# Patient Record
Sex: FEMALE | Race: BLACK OR AFRICAN AMERICAN | NOT HISPANIC OR LATINO | URBAN - METROPOLITAN AREA
[De-identification: names, ages, dates, MRNs, and addresses within clinical notes are randomized per-mention and may not be internally consistent; named-entity substitution may affect disease eponyms.]

---

## 2022-08-08 ENCOUNTER — INPATIENT (INPATIENT)
Facility: HOSPITAL | Age: 82
LOS: 9 days | Discharge: HOME | End: 2022-08-18
Attending: STUDENT IN AN ORGANIZED HEALTH CARE EDUCATION/TRAINING PROGRAM | Admitting: STUDENT IN AN ORGANIZED HEALTH CARE EDUCATION/TRAINING PROGRAM

## 2022-08-08 VITALS
TEMPERATURE: 98 F | HEART RATE: 62 BPM | RESPIRATION RATE: 18 BRPM | SYSTOLIC BLOOD PRESSURE: 153 MMHG | OXYGEN SATURATION: 93 % | DIASTOLIC BLOOD PRESSURE: 92 MMHG

## 2022-08-08 DIAGNOSIS — R07.9 CHEST PAIN, UNSPECIFIED: ICD-10-CM

## 2022-08-08 DIAGNOSIS — N18.6 END STAGE RENAL DISEASE: ICD-10-CM

## 2022-08-08 DIAGNOSIS — Z96.659 PRESENCE OF UNSPECIFIED ARTIFICIAL KNEE JOINT: Chronic | ICD-10-CM

## 2022-08-08 LAB
ALBUMIN SERPL ELPH-MCNC: 3.6 G/DL — SIGNIFICANT CHANGE UP (ref 3.3–5)
ALP SERPL-CCNC: 162 U/L — HIGH (ref 40–120)
ALT FLD-CCNC: 8 U/L — SIGNIFICANT CHANGE UP (ref 4–33)
ANION GAP SERPL CALC-SCNC: 18 MMOL/L — HIGH (ref 7–14)
APTT BLD: 35.9 SEC — SIGNIFICANT CHANGE UP (ref 27–36.3)
AST SERPL-CCNC: 14 U/L — SIGNIFICANT CHANGE UP (ref 4–32)
BASE EXCESS BLDV CALC-SCNC: -2.4 MMOL/L — LOW (ref -2–3)
BASOPHILS # BLD AUTO: 0.03 K/UL — SIGNIFICANT CHANGE UP (ref 0–0.2)
BASOPHILS NFR BLD AUTO: 0.3 % — SIGNIFICANT CHANGE UP (ref 0–2)
BILIRUB SERPL-MCNC: 1.1 MG/DL — SIGNIFICANT CHANGE UP (ref 0.2–1.2)
BLOOD GAS VENOUS COMPREHENSIVE RESULT: SIGNIFICANT CHANGE UP
BUN SERPL-MCNC: 58 MG/DL — HIGH (ref 7–23)
CALCIUM SERPL-MCNC: 10.3 MG/DL — SIGNIFICANT CHANGE UP (ref 8.4–10.5)
CHLORIDE BLDV-SCNC: 100 MMOL/L — SIGNIFICANT CHANGE UP (ref 96–108)
CHLORIDE SERPL-SCNC: 100 MMOL/L — SIGNIFICANT CHANGE UP (ref 98–107)
CO2 BLDV-SCNC: 25.7 MMOL/L — SIGNIFICANT CHANGE UP (ref 22–26)
CO2 SERPL-SCNC: 22 MMOL/L — SIGNIFICANT CHANGE UP (ref 22–31)
CREAT SERPL-MCNC: 13.13 MG/DL — HIGH (ref 0.5–1.3)
EGFR: 3 ML/MIN/1.73M2 — LOW
EOSINOPHIL # BLD AUTO: 0.02 K/UL — SIGNIFICANT CHANGE UP (ref 0–0.5)
EOSINOPHIL NFR BLD AUTO: 0.2 % — SIGNIFICANT CHANGE UP (ref 0–6)
GAS PNL BLDV: 135 MMOL/L — LOW (ref 136–145)
GLUCOSE BLDV-MCNC: 92 MG/DL — SIGNIFICANT CHANGE UP (ref 70–99)
GLUCOSE SERPL-MCNC: 98 MG/DL — SIGNIFICANT CHANGE UP (ref 70–99)
HCO3 BLDV-SCNC: 24 MMOL/L — SIGNIFICANT CHANGE UP (ref 22–29)
HCT VFR BLD CALC: 38.6 % — SIGNIFICANT CHANGE UP (ref 34.5–45)
HCT VFR BLDA CALC: 40 % — SIGNIFICANT CHANGE UP (ref 34.5–46.5)
HGB BLD CALC-MCNC: 13.3 G/DL — SIGNIFICANT CHANGE UP (ref 11.5–15.5)
HGB BLD-MCNC: 12.8 G/DL — SIGNIFICANT CHANGE UP (ref 11.5–15.5)
IANC: 9.21 K/UL — HIGH (ref 1.8–7.4)
IMM GRANULOCYTES NFR BLD AUTO: 0.6 % — SIGNIFICANT CHANGE UP (ref 0–1.5)
INR BLD: 1.18 RATIO — HIGH (ref 0.88–1.16)
LACTATE BLDV-MCNC: 1 MMOL/L — SIGNIFICANT CHANGE UP (ref 0.5–2)
LYMPHOCYTES # BLD AUTO: 0.63 K/UL — LOW (ref 1–3.3)
LYMPHOCYTES # BLD AUTO: 5.8 % — LOW (ref 13–44)
MAGNESIUM SERPL-MCNC: 2.4 MG/DL — SIGNIFICANT CHANGE UP (ref 1.6–2.6)
MCHC RBC-ENTMCNC: 31 PG — SIGNIFICANT CHANGE UP (ref 27–34)
MCHC RBC-ENTMCNC: 33.2 GM/DL — SIGNIFICANT CHANGE UP (ref 32–36)
MCV RBC AUTO: 93.5 FL — SIGNIFICANT CHANGE UP (ref 80–100)
MONOCYTES # BLD AUTO: 0.84 K/UL — SIGNIFICANT CHANGE UP (ref 0–0.9)
MONOCYTES NFR BLD AUTO: 7.8 % — SIGNIFICANT CHANGE UP (ref 2–14)
NEUTROPHILS # BLD AUTO: 9.21 K/UL — HIGH (ref 1.8–7.4)
NEUTROPHILS NFR BLD AUTO: 85.3 % — HIGH (ref 43–77)
NRBC # BLD: 0 /100 WBCS — SIGNIFICANT CHANGE UP
NRBC # FLD: 0 K/UL — SIGNIFICANT CHANGE UP
NT-PROBNP SERPL-SCNC: HIGH PG/ML
PCO2 BLDV: 48 MMHG — HIGH (ref 39–42)
PH BLDV: 7.31 — LOW (ref 7.32–7.43)
PHOSPHATE SERPL-MCNC: 4.7 MG/DL — HIGH (ref 2.5–4.5)
PLATELET # BLD AUTO: 228 K/UL — SIGNIFICANT CHANGE UP (ref 150–400)
PO2 BLDV: 64 MMHG — SIGNIFICANT CHANGE UP
POTASSIUM BLDV-SCNC: 4.4 MMOL/L — SIGNIFICANT CHANGE UP (ref 3.5–5.1)
POTASSIUM SERPL-MCNC: 4.5 MMOL/L — SIGNIFICANT CHANGE UP (ref 3.5–5.3)
POTASSIUM SERPL-SCNC: 4.5 MMOL/L — SIGNIFICANT CHANGE UP (ref 3.5–5.3)
PROT SERPL-MCNC: 7 G/DL — SIGNIFICANT CHANGE UP (ref 6–8.3)
PROTHROM AB SERPL-ACNC: 13.7 SEC — HIGH (ref 10.5–13.4)
RBC # BLD: 4.13 M/UL — SIGNIFICANT CHANGE UP (ref 3.8–5.2)
RBC # FLD: 16.4 % — HIGH (ref 10.3–14.5)
SAO2 % BLDV: 88.7 % — SIGNIFICANT CHANGE UP
SARS-COV-2 RNA SPEC QL NAA+PROBE: SIGNIFICANT CHANGE UP
SODIUM SERPL-SCNC: 140 MMOL/L — SIGNIFICANT CHANGE UP (ref 135–145)
TROPONIN T, HIGH SENSITIVITY RESULT: 78 NG/L — CRITICAL HIGH
TROPONIN T, HIGH SENSITIVITY RESULT: 79 NG/L — CRITICAL HIGH
WBC # BLD: 10.79 K/UL — HIGH (ref 3.8–10.5)
WBC # FLD AUTO: 10.79 K/UL — HIGH (ref 3.8–10.5)

## 2022-08-08 PROCEDURE — 99253 IP/OBS CNSLTJ NEW/EST LOW 45: CPT

## 2022-08-08 PROCEDURE — 99285 EMERGENCY DEPT VISIT HI MDM: CPT | Mod: 25

## 2022-08-08 PROCEDURE — 99223 1ST HOSP IP/OBS HIGH 75: CPT

## 2022-08-08 PROCEDURE — 36556 INSERT NON-TUNNEL CV CATH: CPT

## 2022-08-08 PROCEDURE — 71045 X-RAY EXAM CHEST 1 VIEW: CPT | Mod: 26

## 2022-08-08 RX ORDER — ACETAMINOPHEN 500 MG
1000 TABLET ORAL ONCE
Refills: 0 | Status: COMPLETED | OUTPATIENT
Start: 2022-08-08 | End: 2022-08-08

## 2022-08-08 RX ORDER — AMLODIPINE BESYLATE 2.5 MG/1
10 TABLET ORAL DAILY
Refills: 0 | Status: DISCONTINUED | OUTPATIENT
Start: 2022-08-09 | End: 2022-08-18

## 2022-08-08 RX ORDER — LIDOCAINE HCL 20 MG/ML
10 VIAL (ML) INJECTION ONCE
Refills: 0 | Status: COMPLETED | OUTPATIENT
Start: 2022-08-08 | End: 2022-08-08

## 2022-08-08 RX ORDER — SEVELAMER CARBONATE 2400 MG/1
800 POWDER, FOR SUSPENSION ORAL
Refills: 0 | Status: DISCONTINUED | OUTPATIENT
Start: 2022-08-08 | End: 2022-08-11

## 2022-08-08 RX ORDER — ACETAMINOPHEN 500 MG
650 TABLET ORAL EVERY 6 HOURS
Refills: 0 | Status: DISCONTINUED | OUTPATIENT
Start: 2022-08-08 | End: 2022-08-18

## 2022-08-08 RX ORDER — ONDANSETRON 8 MG/1
4 TABLET, FILM COATED ORAL EVERY 8 HOURS
Refills: 0 | Status: DISCONTINUED | OUTPATIENT
Start: 2022-08-08 | End: 2022-08-18

## 2022-08-08 RX ORDER — HEPARIN SODIUM 5000 [USP'U]/ML
5000 INJECTION INTRAVENOUS; SUBCUTANEOUS EVERY 8 HOURS
Refills: 0 | Status: COMPLETED | OUTPATIENT
Start: 2022-08-08 | End: 2022-08-16

## 2022-08-08 RX ORDER — SEVELAMER CARBONATE 2400 MG/1
1 POWDER, FOR SUSPENSION ORAL
Qty: 0 | Refills: 0 | DISCHARGE

## 2022-08-08 RX ORDER — METOPROLOL TARTRATE 50 MG
50 TABLET ORAL
Refills: 0 | Status: DISCONTINUED | OUTPATIENT
Start: 2022-08-08 | End: 2022-08-18

## 2022-08-08 RX ORDER — ATORVASTATIN CALCIUM 80 MG/1
20 TABLET, FILM COATED ORAL AT BEDTIME
Refills: 0 | Status: DISCONTINUED | OUTPATIENT
Start: 2022-08-08 | End: 2022-08-18

## 2022-08-08 RX ORDER — LANOLIN ALCOHOL/MO/W.PET/CERES
3 CREAM (GRAM) TOPICAL AT BEDTIME
Refills: 0 | Status: DISCONTINUED | OUTPATIENT
Start: 2022-08-08 | End: 2022-08-18

## 2022-08-08 RX ADMIN — Medication 10 MILLILITER(S): at 17:06

## 2022-08-08 RX ADMIN — Medication 400 MILLIGRAM(S): at 21:43

## 2022-08-08 NOTE — ED PROVIDER NOTE - ATTENDING CONTRIBUTION TO CARE
I performed a face-to-face evaluation of the patient and performed a history and physical examination. I agree with the history and physical examination. If this was a PA visit, I personally saw the patient with the PA and performed a substantive portion of the visit including all aspects of the medical decision making.    Dialysis fistula has clot and had TPA administered into it today. After that, felt muscle shaking and chest pain. Not likely PNA: no infectious Sx (eg, purulent cough). Not likely PE or other VTE: PERC or Wells low score, EKG no e/o R-heart strain. Will evaluate for for ACS. Admit for dialysis.

## 2022-08-08 NOTE — CONSULT NOTE ADULT - PROBLEM SELECTOR RECOMMENDATION 9
Pt. with ESRD on HD TIW (TTS schedule) via RUE AVF at [facility] in [place]. Pt's nephrologist is Dr. Parth Ellis. Last outpatient HD treatment was on 8/4/22. Trace Bl LE edema on exam. Pt. presenting with chest pain with elevated troponin and T-wave inversions on EKG. Recommend to rule out ACS.  Labs reviewed. Serum potassium and SCO2 are within normal limits. Pt does not appear uremic or volume overloaded on exam. Plan for HD tomorrow if ACS ruled out. HD consent obtained and placed in pt's chart. Monitor BP and labs.    Plan discussed with attending on call.    If you have any questions, please feel free to contact me  Trice Colvin  Nephrology Fellow  [phone] / Microsoft Teams(Preferred)  (After 5pm or on weekends please page the on-call fellow)

## 2022-08-08 NOTE — H&P ADULT - PROBLEM SELECTOR PLAN 3
Per patient's documents, she has hx of Afib though she does not know much of it.  -Will need to reach out to her PCP regarding hx of Afib and why she is not on AC. She denies hx of major GIB however she seems like a fall risk  -[id number] score 4  -Discussed with patient R/B of AC. Will hold AC tonight until further clarification with PCP/cards in AM of why she is not on AC. Daily risk of holding AC is very low.

## 2022-08-08 NOTE — ED PROVIDER NOTE - CADM POA PRESS ULCER
Pt complains of intermittent epigastric aching while watching television tonight. States she felt urge belch. Denies nausea SOB. States she took 1 SL nitro with some relief.
No

## 2022-08-08 NOTE — ED PROVIDER NOTE - OBJECTIVE STATEMENT
81 Yr old female HTN Left arm lymphedema ESRD On Tu/Th/Sat Right AVF last HD session on Thursday, post HD noted bleeding, Could not do HD on Saturday as HD catheter was blocked, patient followed with her Vascular surgeon today, IR adminstered 8 mg Alteplase in Right AVF following which patient had episode of right sided chest tightness and developed generalized muscle jerking.   Patient denies fever, cough, SOB, palpitations, abdominal pain, muscle/joint aches. 81 Yr old female HTN Left arm lymphedema ESRD (Nephrologist = Parth Ellis: [phone]) On Tu/Th/Sat Right AVF last HD session on Thursday, post HD noted bleeding, Could not do HD on Saturday as HD catheter was blocked, patient followed with her Vascular surgeon today, IR adminstered 8 mg Alteplase in Right AVF following which patient had episode of right sided chest tightness and developed generalized muscle jerking.   Patient denies fever, cough, SOB, palpitations, abdominal pain, muscle/joint aches.

## 2022-08-08 NOTE — ED PROVIDER NOTE - NS ED ROS FT
CONSTITUTIONAL: generalized muscle jerking   EYES/ENT: No visual changes;  No vertigo or throat pain   NECK: No pain or stiffness  RESPIRATORY: No cough, wheezing, hemoptysis; No shortness of breath  CARDIOVASCULAR: right chest tightness  GASTROINTESTINAL: No abdominal or epigastric pain. No nausea, vomiting, or hematemesis; No diarrhea or constipation. No melena or hematochezia.  GENITOURINARY: No dysuria, frequency or hematuria  NEUROLOGICAL: No numbness or weakness    All other review of systems is negative unless indicated above.
5

## 2022-08-08 NOTE — H&P ADULT - NSHPLABSRESULTS_GEN_ALL_CORE
08-08    140  |  100  |  58<H>  ----------------------------<  98  4.5   |  22  |  13.13<H>    Ca    10.3      08 Aug 2022 17:30  Phos  4.7     08-08  Mg     2.40     08-08    TPro  7.0  /  Alb  3.6  /  TBili  1.1  /  DBili  x   /  AST  14  /  ALT  8   /  AlkPhos  162<H>  08-08                            12.8   10.79 )-----------( 228      ( 08 Aug 2022 17:30 )             38.6     EKG: Afib . Two different EKGs were done however both seems to be Afib but rate controlled    CXR IMPRESSION: Cardiomegaly with right effusion.    LIVER FUNCTIONS - ( 08 Aug 2022 17:30 )  Alb: 3.6 g/dL / Pro: 7.0 g/dL / ALK PHOS: 162 U/L / ALT: 8 U/L / AST: 14 U/L / GGT: x             PT/INR - ( 08 Aug 2022 17:30 )   PT: 13.7 sec;   INR: 1.18 ratio         PTT - ( 08 Aug 2022 17:30 )  PTT:35.9 sec

## 2022-08-08 NOTE — ED ADULT NURSE REASSESSMENT NOTE - NS ED NURSE REASSESS COMMENT FT1
Received report from day shift RN. Patient received A&Ox4 with R femoral access placed by ED MD. Pt offering no complaints and is in no acute distress at this time.  Respirations even and labored, noted to be 88% on RA upon arrival and placed on 4LNC by author of note with resolve.  Noted to be febrile at this time, covering team paged at this time for further orders. Swelling to L arm noted consistent with chief complaint, KATERIN fistula noted. Stretcher in lowest position, wheels locked, side rails up as per protocol. Vital signs are per flowsheet.

## 2022-08-08 NOTE — H&P ADULT - ASSESSMENT
81 Yr old female HTN, Left arm lymphedema, reported hx of Afib not on AC, ESRD On Tu/Th/Sat, Right AVF last HD session on Thursday now p/w R chest pain reproducible on palpation after TPA of her fistula. Admitted for new vascular access for dialysis and chest pain work up.

## 2022-08-08 NOTE — ED PROVIDER NOTE - CLINICAL SUMMARY MEDICAL DECISION MAKING FREE TEXT BOX
81 Yr old female HTN Left arm lymphedema ESRD On Tu/Th/Sat Right AVF last HD session on Thursday, post HD noted bleeding, Could not do HD on Saturday as HD catheter was blocked, patient followed with her Vascular surgeon today, IR adminstered 8 mg Alteplase in Right AVF following which patient had episode of right sided chest tightness and developed generalized muscle jerking. VS wnl. PE BL creps, Right arm AV fistula has no redness/discharge/is non-tender, Left arm has lymphedema, BL LE are edematous. Plan for labs, CXR, EKG, Nephrology/Vascular consult.

## 2022-08-08 NOTE — CONSULT NOTE ADULT - SUBJECTIVE AND OBJECTIVE BOX
[facility] DIVISION OF KIDNEY DISEASES AND HYPERTENSION -- [phone]  -- INITIAL CONSULT NOTE  --------------------------------------------------------------------------------  HPI: 80 yo F with h/o ESRD on HD TIW (TTS via RUE AVF), HTN, L arm lymphedema sent from clinic for chest pain and upper body jerking movements. Nephrology consulted for ESRD, resumption of HD. Pt's nephrologist is Dr. Parth Ellis at [facility] in [place]. Last HD session was on Thursday, 8/4/22. Pt was noted to have light bleeding for AVF following dialysis session which resolved. Pt then went for dialysis session on Saturday, 8/6/22 but AVF was not functioning and pt was unable to receive HD. Today, pt went to see IR to get AVF evaluated. Was given alteplase and started having R sided chest tightness and upper body jerking movements. Pt was then sent to the ER.    Pt has been on HD for the past ~10 years due to uncontrolled HTN. Pt no longer makes urine. Denies any prior AVF malfunction. Pt has had rare episodes of intra-dialytic hypotension but not on any meds. Pt reports R sided chest tenderness and decreased appetite. Denies any headaches, fevers/chills, SOB, abdominal pain, and leg swelling.        PAST HISTORY  --------------------------------------------------------------------------------  PAST MEDICAL & SURGICAL HISTORY:  HTN (hypertension)  ESRD on hemodialysis  Lymphedema        FAMILY HISTORY: Multiple family members with HTN    PAST SOCIAL HISTORY: Denies ETOH, and history of smoking.    ALLERGIES & MEDICATIONS  --------------------------------------------------------------------------------  Allergies    No Known Allergies    Intolerances      Standing Inpatient Medications    PRN Inpatient Medications      REVIEW OF SYSTEMS  --------------------------------------------------------------------------------  Gen: No fevers/chills  Skin: No rashes  Head/Eyes/Ears: Normal hearing,   Respiratory: No dyspnea, cough  CV: Positive for chest pain  GI: No abdominal pain, Positive for decreased appetite  : No dysuria, hematuria  MSK: No edema  Heme: No easy bruising or bleeding  Psych: No significant depression    All other systems were reviewed and are negative, except as noted.    VITALS/PHYSICAL EXAM  --------------------------------------------------------------------------------  T(C): 36.7 (08-08-22 @ 13:55), Max: 36.7 (08-08-22 @ 13:55)  HR: 58 (08-08-22 @ 17:00) (58 - 62)  BP: 145/71 (08-08-22 @ 17:00) (145/71 - 153/92)  RR: 16 (08-08-22 @ 17:00) (16 - 18)  SpO2: 100% (08-08-22 @ 17:00) (93% - 100%)  Wt(kg): --        Physical Exam:  Gen: NAD  HEENT: MMM  Pulm: CTA B/L  CV: S1S2  Abd: Soft, +BS   Ext: Trace BL LE edema  Neuro: Awake  Skin: Warm and dry  Vascular access: RUE AVF, unable to palpate thrill. Poor bruit. R femoral non-tunneled HD catheter noted    LABS/STUDIES  --------------------------------------------------------------------------------              12.8   10.79 >-----------<  228      [08-08-22 @ 17:30]              38.6     140  |  100  |  58  ----------------------------<  98      [08-08-22 @ 17:30]  4.5   |  22  |  13.13        Ca     10.3     [08-08-22 @ 17:30]      Mg     2.40     [08-08-22 @ 17:30]      Phos  4.7     [08-08-22 @ 17:30]    TPro  7.0  /  Alb  3.6  /  TBili  1.1  /  DBili  x   /  AST  14  /  ALT  8   /  AlkPhos  162  [08-08-22 @ 17:30]    PT/INR: PT 13.7 , INR 1.18       [08-08-22 @ 17:30]  PTT: 35.9       [08-08-22 @ 17:30]    Creatinine Trend:  SCr 13.13 [08-08 @ 17:30] [facility] DIVISION OF KIDNEY DISEASES AND HYPERTENSION -- [phone]  -- INITIAL CONSULT NOTE  --------------------------------------------------------------------------------  HPI: 80 yo F with h/o ESRD on HD TIW (TTS via RUE AVF), HTN, L arm lymphedema sent from clinic for chest pain and upper body jerking movements. Nephrology consulted for ESRD, resumption of HD. Pt's nephrologist is Dr. Parth Ellis at [facility] in [place]. Last HD session was on Thursday, 8/4/22. Pt was noted to have light bleeding for AVF following dialysis session which resolved. Pt then went for dialysis session on Saturday, 8/6/22 but AVF was not functioning and pt was unable to receive HD. Today, pt went to see IR to get AVF evaluated. Was given alteplase and started having R sided chest tightness and upper body jerking movements. Pt was then sent to the ER.    Pt has been on HD for the past ~10 years due to uncontrolled HTN. Pt no longer makes urine. Denies any prior AVF malfunction. Pt has had rare episodes of intra-dialytic hypotension but not on any meds. Pt reports R sided chest tenderness and decreased appetite. Denies any headaches, fevers/chills, SOB, abdominal pain, and leg swelling.    PAST HISTORY  --------------------------------------------------------------------------------  PAST MEDICAL & SURGICAL HISTORY:  HTN (hypertension)  ESRD on hemodialysis  Lymphedema        FAMILY HISTORY: Multiple family members with HTN    PAST SOCIAL HISTORY: Denies ETOH, and history of smoking.    ALLERGIES & MEDICATIONS  --------------------------------------------------------------------------------  Allergies    No Known Allergies    Intolerances      Standing Inpatient Medications    PRN Inpatient Medications      REVIEW OF SYSTEMS  --------------------------------------------------------------------------------  Gen: No fevers/chills  Skin: No rashes  Head/Eyes/Ears: Normal hearing,   Respiratory: No dyspnea, cough  CV: Positive for chest pain  GI: No abdominal pain, Positive for decreased appetite  : No dysuria, hematuria  MSK: No edema  Heme: No easy bruising or bleeding  Psych: No significant depression    All other systems were reviewed and are negative, except as noted.    VITALS/PHYSICAL EXAM  --------------------------------------------------------------------------------  T(C): 36.7 (08-08-22 @ 13:55), Max: 36.7 (08-08-22 @ 13:55)  HR: 58 (08-08-22 @ 17:00) (58 - 62)  BP: 145/71 (08-08-22 @ 17:00) (145/71 - 153/92)  RR: 16 (08-08-22 @ 17:00) (16 - 18)  SpO2: 100% (08-08-22 @ 17:00) (93% - 100%)  Wt(kg): --        Physical Exam:  Gen: NAD  HEENT: MMM  Pulm: CTA B/L  CV: S1S2  Abd: Soft, +BS   Ext: Trace BL LE edema  Neuro: Awake  Skin: Warm and dry  Vascular access: RUE AVF, unable to palpate thrill. Poor bruit. R femoral non-tunneled HD catheter noted    LABS/STUDIES  --------------------------------------------------------------------------------              12.8   10.79 >-----------<  228      [08-08-22 @ 17:30]              38.6     140  |  100  |  58  ----------------------------<  98      [08-08-22 @ 17:30]  4.5   |  22  |  13.13        Ca     10.3     [08-08-22 @ 17:30]      Mg     2.40     [08-08-22 @ 17:30]      Phos  4.7     [08-08-22 @ 17:30]    TPro  7.0  /  Alb  3.6  /  TBili  1.1  /  DBili  x   /  AST  14  /  ALT  8   /  AlkPhos  162  [08-08-22 @ 17:30]    PT/INR: PT 13.7 , INR 1.18       [08-08-22 @ 17:30]  PTT: 35.9       [08-08-22 @ 17:30]    Creatinine Trend:  SCr 13.13 [08-08 @ 17:30]

## 2022-08-08 NOTE — ED PROCEDURE NOTE - CPROC ED INFORMED CONSENT1
Benefits, risks, and possible complications of procedure explained to patient/caregiver who verbalized understanding and gave written consent.
PAST MEDICAL HISTORY:  No pertinent past medical history

## 2022-08-08 NOTE — ED ADULT NURSE NOTE - CHIEF COMPLAINT QUOTE
Patient brought to ER by EMS from [facility] vascular Surgery for clotted dialysis access. TPA put into clot after that she had jerky movements and  c/o chest pain.  . Dialysis access right forearm. Has not been dialyzed since Thursday (Tues, Thurs, Sat).  Patient c/o active chest pain.

## 2022-08-08 NOTE — ED ADULT TRIAGE NOTE - CHIEF COMPLAINT QUOTE
Patient brought to ER by EMS from [facility] vascular Surgery for clotted dialysis access. TPA put into clot after that she had jerky movements and  c/o chest pain.  . Dialysis access right forearm. Has not been dialyzed since Thursday (Tues, Thurs, Sat). Patient brought to ER by EMS from [facility] vascular Surgery for clotted dialysis access. TPA put into clot after that she had jerky movements and  c/o chest pain.  . Dialysis access right forearm. Has not been dialyzed since Thursday (Tues, Thurs, Sat).  Patient c/o active chest pain.

## 2022-08-08 NOTE — ED ADULT NURSE NOTE - OBJECTIVE STATEMENT
Break Coverage;  Receive report from RN . Pt is alert and oriented x 4  sent from [place] with clotted dialysis access.   and chest pain.  Pt stated she started having jerky movements after  medicine  was placed in access to remove clots. Resp unlabored.  Pt NSR\ on cardiac monitor .   Dialysis port to R upper arm.  L arm lymphedema noted.  No IV access noted.  BP done on L leg.  MD aware.  Pt awaiting IV and labs.  Family at bedside

## 2022-08-08 NOTE — H&P ADULT - NSICDXPASTMEDICALHX_GEN_ALL_CORE_FT
PAST MEDICAL HISTORY:  Atrial fibrillation     ESRD on hemodialysis     HTN (hypertension)     Lymphedema

## 2022-08-08 NOTE — ED PROVIDER NOTE - PROGRESS NOTE DETAILS
Grace: Cr 13, K 4.5, trop ~78 x 2. Will repeat EKG. If no change from prior here today, then unlikely to be ACS.

## 2022-08-08 NOTE — H&P ADULT - NSHPOUTPATIENTPROVIDERS_GEN_ALL_CORE
Nephrologist = Parth Ellis: [phone] Nephrologist = Parth Ellis: [phone]  pCP: Chico Fonseca

## 2022-08-08 NOTE — H&P ADULT - HISTORY OF PRESENT ILLNESS
81 Yr old female HTN, Left arm lymphedema, ESRD On Tu/Th/Sat, Right AVF last HD session on Thursday. She had post HD noted bleeding on 8/4. Pt could not do HD on Saturday as HD catheter was blocked. Patient followed with her Vascular surgeon today and had adminstered 8 mg Alteplase in Right AVF after which patient had episode of right sided chest tightness and developed generalized R chest wall tenderness. She is otherwise in her usual state of health and does not move around much at baseline. Denies prior AVF malfunctions. Patient denies fever, cough, SOB, palpitations, abdominal pain, muscle/joint aches, dysuria, diarrhea, headaches.   In ED, VSS, Trop 78->79, Cr 13.13, probnp [id number]. EKG: new onset Afib as patient denies hx of atrial fibrillation. . Two different EKGs were done however both seems to be Afib but rate controlled. Nephro and Vascular seen in ED. 81 Yr old female HTN, Left arm lymphedema, reported hx of Afib not on AC, ESRD On Tu/Th/Sat, Right AVF last HD session on Thursday. She had post HD noted bleeding on 8/4. Pt could not do HD on Saturday as HD catheter was blocked. Patient followed with her Vascular surgeon today and had adminstered 8 mg Alteplase in Right AVF after which patient had episode of right sided chest tightness and developed generalized R chest wall tenderness. She is otherwise in her usual state of health and does not move around much at baseline. Denies prior AVF malfunctions. Patient denies fever, cough, SOB, palpitations, abdominal pain, muscle/joint aches, dysuria, diarrhea, headaches.   In ED, VSS, Trop 78->79, Cr 13.13, probnp [id number]. EKG: Afib . Two different EKGs were done however both seems to be Afib but rate controlled. Nephro and Vascular seen in ED.

## 2022-08-08 NOTE — ED PROVIDER NOTE - PHYSICAL EXAMINATION
PHYSICAL EXAM:    GENERAL: NAD  HEAD: Normocephalic  EYES: EOMI, PERRLA, conjunctiva and sclera clear  ENT: No erythema/pallor/petechiae/lesions  NECK: Supple  LUNG: BL crep, R>L  HEART: RRR, +S1/S2; No m/r/g  ABDOMEN: soft, NT/ND; BS audible   EXTREMITIES: Right arm AV fistula, no redness/discharge/is non-tender, Left arm has lymphedema, BL LE are edematous.   NERVOUS SYSTEM:  AAOx3, speech is clear. No sensory/motor deficits

## 2022-08-08 NOTE — H&P ADULT - PROBLEM SELECTOR PLAN 1
Chest pain is more MSK in nature. Reproducible to touch. Trop elevated likely in setting of CKD however EKg without obvious ischemic changes except for TWI unsure if they are old. Suspect this chest pain could be from an underlying hematoma after TPA.  -Will get CT chest noncont to eval for subq hematoma and pleural effusion  -Defer to cards consult for chest pain eval from cardiac perspective  -Trend Trop  -Echo pending in AM  -Hold ASA and AC for now unless clinical status changes

## 2022-08-08 NOTE — CONSULT NOTE ADULT - SUBJECTIVE AND OBJECTIVE BOX
VASCULAR SURGERY CONSULT NOTE  HPI: 81y F PMH HTN, Left arm lymphedema, ESRD on HD via RUE AVF (Tu/Th/Sa), presenting with malfunctioning AVF and chest pain. Patient reports she was unable to receive HD at her session this past Saturday, nurses told her fistula was "blocked". Last HD Thursday, at which time there was bleeding noted at the end of the session, resolved with pressure. Patient followed up with her Vascular Surgeon as an outpatient today (family cannot remember the name). IR administered 8 mg Alteplase into Right AVF following which patient had episode of right sided chest tightness and developed generalized muscle jerking, prompting presentation to ED. Patient denies previous revisions or additional procedures to fistula since its creation. Patient denies fever, cough, SOB, palpitations, abdominal pain, muscle/joint aches. Home Nephrologist is Parth Ellis        PAST MEDICAL & SURGICAL HISTORY:  HTN (hypertension)      ESRD on hemodialysis      Lymphedema      Home Medications:      Allergies    No Known Allergies    Intolerances        SOCIAL HISTORY: Denies tobacco, EtOH, other drug use      Physical Exam:  General: NAD, resting comfortably  HEENT: NC/AT, EOMI, normal hearing, no oral lesions, no LAD, neck supple  Pulmonary: normal resp effort on RA  Cardiovascular: NSR, no murmurs  Abdominal: soft, ND/NT, no organomegaly  Extremities: WWP, RUE BC AVF with palpable thrill  Neuro: A/O x 3  Pulses: palpable radial and DP pulses bilaterally      Vital Signs Last 24 Hrs  T(C): 36.7 (08 Aug 2022 13:55), Max: 36.7 (08 Aug 2022 13:55)  T(F): 98 (08 Aug 2022 13:55), Max: 98 (08 Aug 2022 13:55)  HR: 58 (08 Aug 2022 17:00) (58 - 62)  BP: 145/71 (08 Aug 2022 17:00) (145/71 - 153/92)  BP(mean): --  RR: 16 (08 Aug 2022 17:00) (16 - 18)  SpO2: 100% (08 Aug 2022 17:00) (93% - 100%)    Parameters below as of 08 Aug 2022 17:00  Patient On (Oxygen Delivery Method): room air        I&O's Summary          LABS:                        12.8   10.79 )-----------( 228      ( 08 Aug 2022 17:30 )             38.6     08-08    140  |  100  |  58<H>  ----------------------------<  98  4.5   |  22  |  13.13<H>    Ca    10.3      08 Aug 2022 17:30  Phos  4.7     08-08  Mg     2.40     08-08    TPro  7.0  /  Alb  3.6  /  TBili  1.1  /  DBili  x   /  AST  14  /  ALT  8   /  AlkPhos  162<H>  08-08    PT/INR - ( 08 Aug 2022 17:30 )   PT: 13.7 sec;   INR: 1.18 ratio         PTT - ( 08 Aug 2022 17:30 )  PTT:35.9 sec    CAPILLARY BLOOD GLUCOSE        LIVER FUNCTIONS - ( 08 Aug 2022 17:30 )  Alb: 3.6 g/dL / Pro: 7.0 g/dL / ALK PHOS: 162 U/L / ALT: 8 U/L / AST: 14 U/L / GGT: x               RADIOLOGY & ADDITIONAL STUDIES:  < from: Xray Chest 1 View AP/PA (08.08.22 @ 17:42) >    INTERPRETATION:  Right lower lung hazy opacity, which may represent   pleural effusion and/or atelactasis.        ******PRELIMINARY REPORT******      < end of copied text >   VASCULAR SURGERY CONSULT NOTE  HPI: 81y F PMH HTN, Left arm lymphedema, ESRD on HD via RUE AVF (Tu/Th/Sa), presenting with malfunctioning AVF and chest pain. Patient reports she was unable to receive HD at her session this past Saturday, nurses told her fistula was "blocked". Last HD Thursday, at which time there was bleeding noted at the end of the session, resolved with pressure. Patient [facility] today as an outpatient to have the fistula evaluated. 8 mg Alteplase into Right AVF following which patient had episode of right sided chest tightness and developed upper extremity jerking movements, prompting presentation to ED. Patient reports she has had tPA injected into fistula previously without any issue. Patient denies fever, cough, SOB, palpitations, abdominal pain, muscle/joint aches. Home Nephrologist is Parth Ellis.        PAST MEDICAL & SURGICAL HISTORY:  HTN (hypertension)      ESRD on hemodialysis      Lymphedema      Home Medications:      Allergies    No Known Allergies    Intolerances        SOCIAL HISTORY: Denies tobacco, EtOH, other drug use      Physical Exam:  General: NAD, resting comfortably  HEENT: NC/AT, EOMI, normal hearing, no oral lesions, no LAD, neck supple  Pulmonary: normal resp effort on RA  Cardiovascular: NSR, no murmurs  Abdominal: soft, ND/NT, no organomegaly  Extremities: LUE with lymphedema from fingers to shoulder. RUE BC AVF with palpable pulse. 2 capped angiocaths in proximal and distal ends of fistula, covered with tegaderms  Neuro: A/O x 2-3  Pulses: palpable radial and DP pulses bilaterally      Vital Signs Last 24 Hrs  T(C): 36.7 (08 Aug 2022 13:55), Max: 36.7 (08 Aug 2022 13:55)  T(F): 98 (08 Aug 2022 13:55), Max: 98 (08 Aug 2022 13:55)  HR: 58 (08 Aug 2022 17:00) (58 - 62)  BP: 145/71 (08 Aug 2022 17:00) (145/71 - 153/92)  BP(mean): --  RR: 16 (08 Aug 2022 17:00) (16 - 18)  SpO2: 100% (08 Aug 2022 17:00) (93% - 100%)    Parameters below as of 08 Aug 2022 17:00  Patient On (Oxygen Delivery Method): room air        I&O's Summary          LABS:                        12.8   10.79 )-----------( 228      ( 08 Aug 2022 17:30 )             38.6     08-08    140  |  100  |  58<H>  ----------------------------<  98  4.5   |  22  |  13.13<H>    Ca    10.3      08 Aug 2022 17:30  Phos  4.7     08-08  Mg     2.40     08-08    TPro  7.0  /  Alb  3.6  /  TBili  1.1  /  DBili  x   /  AST  14  /  ALT  8   /  AlkPhos  162<H>  08-08    PT/INR - ( 08 Aug 2022 17:30 )   PT: 13.7 sec;   INR: 1.18 ratio         PTT - ( 08 Aug 2022 17:30 )  PTT:35.9 sec    CAPILLARY BLOOD GLUCOSE        LIVER FUNCTIONS - ( 08 Aug 2022 17:30 )  Alb: 3.6 g/dL / Pro: 7.0 g/dL / ALK PHOS: 162 U/L / ALT: 8 U/L / AST: 14 U/L / GGT: x               RADIOLOGY & ADDITIONAL STUDIES:  < from: Xray Chest 1 View AP/PA (08.08.22 @ 17:42) >    INTERPRETATION:  Right lower lung hazy opacity, which may represent   pleural effusion and/or atelactasis.        ******PRELIMINARY REPORT******      < end of copied text >   VASCULAR SURGERY CONSULT NOTE  HPI: 81y F PMH HTN, Left arm lymphedema, ESRD on HD via RUE AVF (Tu/Th/Sa), presenting with malfunctioning AVF and chest pain. Patient reports she was unable to receive HD at her session this past Saturday, nurses told her fistula was "blocked". Last HD Thursday, at which time there was bleeding noted at the end of the session, resolved with pressure. Patient [facility] today as an outpatient to have the fistula evaluated. 8 mg Alteplase into Right AVF following which patient had episode of right sided chest tightness and developed upper extremity jerking movements, prompting presentation to ED. Patient reports she has had tPA injected into fistula previously without any issue. Patient denies fever, cough, SOB, palpitations, abdominal pain, muscle/joint aches. Home Nephrologist is Parth Ellis.        PAST MEDICAL & SURGICAL HISTORY:  HTN (hypertension)      ESRD on hemodialysis      Lymphedema      Home Medications:      Allergies    No Known Allergies    Intolerances        SOCIAL HISTORY: Denies tobacco, EtOH, other drug use      Physical Exam:  General: NAD, resting comfortably  HEENT: NC/AT, EOMI, normal hearing, no oral lesions, no LAD, neck supple  Pulmonary: normal resp effort on RA  Cardiovascular: NSR, no murmurs  Abdominal: soft, ND/NT, no organomegaly  Extremities: LUE with lymphedema from fingers to shoulder. RUE BC AVF with palpable pulse. 2 capped angiocaths in proximal and distal ends of fistula, covered with tegaderms  Neuro: A/O x 2-3  Groin: R femoral Cordis noted  Pulses: palpable radial and DP pulses bilaterally      Vital Signs Last 24 Hrs  T(C): 36.7 (08 Aug 2022 13:55), Max: 36.7 (08 Aug 2022 13:55)  T(F): 98 (08 Aug 2022 13:55), Max: 98 (08 Aug 2022 13:55)  HR: 58 (08 Aug 2022 17:00) (58 - 62)  BP: 145/71 (08 Aug 2022 17:00) (145/71 - 153/92)  BP(mean): --  RR: 16 (08 Aug 2022 17:00) (16 - 18)  SpO2: 100% (08 Aug 2022 17:00) (93% - 100%)    Parameters below as of 08 Aug 2022 17:00  Patient On (Oxygen Delivery Method): room air        I&O's Summary          LABS:                        12.8   10.79 )-----------( 228      ( 08 Aug 2022 17:30 )             38.6     08-08    140  |  100  |  58<H>  ----------------------------<  98  4.5   |  22  |  13.13<H>    Ca    10.3      08 Aug 2022 17:30  Phos  4.7     08-08  Mg     2.40     08-08    TPro  7.0  /  Alb  3.6  /  TBili  1.1  /  DBili  x   /  AST  14  /  ALT  8   /  AlkPhos  162<H>  08-08    PT/INR - ( 08 Aug 2022 17:30 )   PT: 13.7 sec;   INR: 1.18 ratio         PTT - ( 08 Aug 2022 17:30 )  PTT:35.9 sec    CAPILLARY BLOOD GLUCOSE        LIVER FUNCTIONS - ( 08 Aug 2022 17:30 )  Alb: 3.6 g/dL / Pro: 7.0 g/dL / ALK PHOS: 162 U/L / ALT: 8 U/L / AST: 14 U/L / GGT: x               RADIOLOGY & ADDITIONAL STUDIES:  < from: Xray Chest 1 View AP/PA (08.08.22 @ 17:42) >    INTERPRETATION:  Right lower lung hazy opacity, which may represent   pleural effusion and/or atelactasis.        ******PRELIMINARY REPORT******      < end of copied text >

## 2022-08-09 DIAGNOSIS — R07.9 CHEST PAIN, UNSPECIFIED: ICD-10-CM

## 2022-08-09 DIAGNOSIS — T82.590A OTHER MECHANICAL COMPLICATION OF SURGICALLY CREATED ARTERIOVENOUS FISTULA, INITIAL ENCOUNTER: ICD-10-CM

## 2022-08-09 DIAGNOSIS — Z29.9 ENCOUNTER FOR PROPHYLACTIC MEASURES, UNSPECIFIED: ICD-10-CM

## 2022-08-09 DIAGNOSIS — I10 ESSENTIAL (PRIMARY) HYPERTENSION: ICD-10-CM

## 2022-08-09 DIAGNOSIS — I48.91 UNSPECIFIED ATRIAL FIBRILLATION: ICD-10-CM

## 2022-08-09 LAB
ALBUMIN SERPL ELPH-MCNC: 3.5 G/DL — SIGNIFICANT CHANGE UP (ref 3.3–5)
ALP SERPL-CCNC: 160 U/L — HIGH (ref 40–120)
ALT FLD-CCNC: 6 U/L — SIGNIFICANT CHANGE UP (ref 4–33)
ANION GAP SERPL CALC-SCNC: 19 MMOL/L — HIGH (ref 7–14)
AST SERPL-CCNC: 11 U/L — SIGNIFICANT CHANGE UP (ref 4–32)
BASOPHILS # BLD AUTO: 0.04 K/UL — SIGNIFICANT CHANGE UP (ref 0–0.2)
BASOPHILS NFR BLD AUTO: 0.4 % — SIGNIFICANT CHANGE UP (ref 0–2)
BILIRUB SERPL-MCNC: 0.8 MG/DL — SIGNIFICANT CHANGE UP (ref 0.2–1.2)
BUN SERPL-MCNC: 67 MG/DL — HIGH (ref 7–23)
CALCIUM SERPL-MCNC: 10.1 MG/DL — SIGNIFICANT CHANGE UP (ref 8.4–10.5)
CHLORIDE SERPL-SCNC: 98 MMOL/L — SIGNIFICANT CHANGE UP (ref 98–107)
CO2 SERPL-SCNC: 22 MMOL/L — SIGNIFICANT CHANGE UP (ref 22–31)
CREAT SERPL-MCNC: 13.77 MG/DL — HIGH (ref 0.5–1.3)
DIALYSIS INSTRUMENT RESULT - HEPATITIS B SURFACE ANTIGEN: NEGATIVE — SIGNIFICANT CHANGE UP
EGFR: 2 ML/MIN/1.73M2 — LOW
EOSINOPHIL # BLD AUTO: 0.07 K/UL — SIGNIFICANT CHANGE UP (ref 0–0.5)
EOSINOPHIL NFR BLD AUTO: 0.6 % — SIGNIFICANT CHANGE UP (ref 0–6)
GLUCOSE SERPL-MCNC: 94 MG/DL — SIGNIFICANT CHANGE UP (ref 70–99)
HBV CORE AB SER-ACNC: SIGNIFICANT CHANGE UP
HBV SURFACE AB SER-ACNC: 15.6 MIU/ML — SIGNIFICANT CHANGE UP
HBV SURFACE AG SER-ACNC: SIGNIFICANT CHANGE UP
HCT VFR BLD CALC: 38.4 % — SIGNIFICANT CHANGE UP (ref 34.5–45)
HCV AB S/CO SERPL IA: 0.22 S/CO — SIGNIFICANT CHANGE UP (ref 0–0.99)
HCV AB SERPL-IMP: SIGNIFICANT CHANGE UP
HGB BLD-MCNC: 12.8 G/DL — SIGNIFICANT CHANGE UP (ref 11.5–15.5)
IANC: 9.03 K/UL — HIGH (ref 1.8–7.4)
IMM GRANULOCYTES NFR BLD AUTO: 0.7 % — SIGNIFICANT CHANGE UP (ref 0–1.5)
INR BLD: 1.3 RATIO — HIGH (ref 0.88–1.16)
LYMPHOCYTES # BLD AUTO: 0.96 K/UL — LOW (ref 1–3.3)
LYMPHOCYTES # BLD AUTO: 8.6 % — LOW (ref 13–44)
MCHC RBC-ENTMCNC: 31.1 PG — SIGNIFICANT CHANGE UP (ref 27–34)
MCHC RBC-ENTMCNC: 33.3 GM/DL — SIGNIFICANT CHANGE UP (ref 32–36)
MCV RBC AUTO: 93.2 FL — SIGNIFICANT CHANGE UP (ref 80–100)
MONOCYTES # BLD AUTO: 1.03 K/UL — HIGH (ref 0–0.9)
MONOCYTES NFR BLD AUTO: 9.2 % — SIGNIFICANT CHANGE UP (ref 2–14)
MRSA PCR RESULT.: SIGNIFICANT CHANGE UP
NEUTROPHILS # BLD AUTO: 9.03 K/UL — HIGH (ref 1.8–7.4)
NEUTROPHILS NFR BLD AUTO: 80.5 % — HIGH (ref 43–77)
NRBC # BLD: 0 /100 WBCS — SIGNIFICANT CHANGE UP
NRBC # FLD: 0 K/UL — SIGNIFICANT CHANGE UP
PLATELET # BLD AUTO: 219 K/UL — SIGNIFICANT CHANGE UP (ref 150–400)
POTASSIUM SERPL-MCNC: 4.4 MMOL/L — SIGNIFICANT CHANGE UP (ref 3.5–5.3)
POTASSIUM SERPL-SCNC: 4.4 MMOL/L — SIGNIFICANT CHANGE UP (ref 3.5–5.3)
PROT SERPL-MCNC: 6.3 G/DL — SIGNIFICANT CHANGE UP (ref 6–8.3)
PROTHROM AB SERPL-ACNC: 15.1 SEC — HIGH (ref 10.5–13.4)
RBC # BLD: 4.12 M/UL — SIGNIFICANT CHANGE UP (ref 3.8–5.2)
RBC # FLD: 16 % — HIGH (ref 10.3–14.5)
S AUREUS DNA NOSE QL NAA+PROBE: SIGNIFICANT CHANGE UP
SODIUM SERPL-SCNC: 139 MMOL/L — SIGNIFICANT CHANGE UP (ref 135–145)
TROPONIN T, HIGH SENSITIVITY RESULT: 93 NG/L — CRITICAL HIGH
WBC # BLD: 11.21 K/UL — HIGH (ref 3.8–10.5)
WBC # FLD AUTO: 11.21 K/UL — HIGH (ref 3.8–10.5)

## 2022-08-09 PROCEDURE — 93990 DOPPLER FLOW TESTING: CPT | Mod: 26

## 2022-08-09 PROCEDURE — 99223 1ST HOSP IP/OBS HIGH 75: CPT

## 2022-08-09 PROCEDURE — 99233 SBSQ HOSP IP/OBS HIGH 50: CPT

## 2022-08-09 PROCEDURE — 36580 REPLACE CVAD CATH: CPT | Mod: RT

## 2022-08-09 RX ORDER — CHLORHEXIDINE GLUCONATE 213 G/1000ML
1 SOLUTION TOPICAL DAILY
Refills: 0 | Status: DISCONTINUED | OUTPATIENT
Start: 2022-08-09 | End: 2022-08-18

## 2022-08-09 RX ADMIN — ATORVASTATIN CALCIUM 20 MILLIGRAM(S): 80 TABLET, FILM COATED ORAL at 21:46

## 2022-08-09 RX ADMIN — HEPARIN SODIUM 5000 UNIT(S): 5000 INJECTION INTRAVENOUS; SUBCUTANEOUS at 06:29

## 2022-08-09 RX ADMIN — CHLORHEXIDINE GLUCONATE 1 APPLICATION(S): 213 SOLUTION TOPICAL at 13:00

## 2022-08-09 RX ADMIN — HEPARIN SODIUM 5000 UNIT(S): 5000 INJECTION INTRAVENOUS; SUBCUTANEOUS at 21:47

## 2022-08-09 RX ADMIN — Medication 50 MILLIGRAM(S): at 17:25

## 2022-08-09 RX ADMIN — AMLODIPINE BESYLATE 10 MILLIGRAM(S): 2.5 TABLET ORAL at 13:16

## 2022-08-09 RX ADMIN — Medication 50 MILLIGRAM(S): at 11:19

## 2022-08-09 RX ADMIN — SEVELAMER CARBONATE 800 MILLIGRAM(S): 2400 POWDER, FOR SUSPENSION ORAL at 11:21

## 2022-08-09 RX ADMIN — HEPARIN SODIUM 5000 UNIT(S): 5000 INJECTION INTRAVENOUS; SUBCUTANEOUS at 13:16

## 2022-08-09 NOTE — PROGRESS NOTE ADULT - SUBJECTIVE AND OBJECTIVE BOX
Vascular Surgery Progress Note    Subjective/24hour Events:   Patient seen and examined.      Vital Signs:  Vital Signs Last 24 Hrs  T(C): 36.8 (09 Aug 2022 10:20), Max: 39.3 (08 Aug 2022 21:25)  T(F): 98.3 (09 Aug 2022 10:20), Max: 102.7 (08 Aug 2022 21:25)  HR: 72 (09 Aug 2022 13:00) (58 - 80)  BP: 132/75 (09 Aug 2022 13:00) (104/63 - 154/70)  BP(mean): --  RR: 17 (09 Aug 2022 14:00) (16 - 18)  SpO2: 99% (09 Aug 2022 14:00) (96% - 100%)    Parameters below as of 09 Aug 2022 14:00  Patient On (Oxygen Delivery Method): room air        CAPILLARY BLOOD GLUCOSE          I&O's Detail    09 Aug 2022 07:01  -  09 Aug 2022 16:27  --------------------------------------------------------  IN:    Other (mL): 400 mL  Total IN: 400 mL    OUT:    Other (mL): 2400 mL  Total OUT: 2400 mL    Total NET: -2000 mL          Physical Exam:  Gen: NAD, resting comfortably in bed  CV: Regular rate  Resp: Nonlabored breathing on room air  Ext: RUE with c/d/i dressing over sutured areas of AVF at former angiocath sites  Vasc: Thrill or pulse not palpated in fistula      Labs:    08-09    139  |  98  |  67<H>  ----------------------------<  94  4.4   |  22  |  13.77<H>    Ca    10.1      09 Aug 2022 06:13  Phos  4.7     08-08  Mg     2.40     08-08    TPro  6.3  /  Alb  3.5  /  TBili  0.8  /  DBili  x   /  AST  11  /  ALT  6   /  AlkPhos  160<H>  08-09    LIVER FUNCTIONS - ( 09 Aug 2022 06:13 )  Alb: 3.5 g/dL / Pro: 6.3 g/dL / ALK PHOS: 160 U/L / ALT: 6 U/L / AST: 11 U/L / GGT: x                                 12.8   11.21 )-----------( 219      ( 09 Aug 2022 06:13 )             38.4     PT/INR - ( 09 Aug 2022 06:13 )   PT: 15.1 sec;   INR: 1.30 ratio         PTT - ( 08 Aug 2022 17:30 )  PTT:35.9 sec

## 2022-08-09 NOTE — PROGRESS NOTE ADULT - ASSESSMENT
81y F PMH HTN, Left arm lymphedema, ESRD on HD via RUE AVF (Tu/Th/Sa), presenting with malfunctioning AVF and chest pain s/p TPA injection into AVF.     Plan:  - F/u RUE AVF Duplex, ordered   - HD via R groin [place]  - Remainder of care per primary team      Vascular (C Team) Surgery  [id number]

## 2022-08-09 NOTE — ED ADULT NURSE REASSESSMENT NOTE - NS ED NURSE REASSESS COMMENT FT1
Report given to [facility] at this time. [facility]. NAD noted. Respirations even and unlabored.

## 2022-08-09 NOTE — PROGRESS NOTE ADULT - SUBJECTIVE AND OBJECTIVE BOX
[facility] Division of Hospital Medicine  Rebecca Her MD  Pager (M-F, 8A-5P): [phone]  Other Times:  [id number]    Patient is a 81y old  Female who presents with a chief complaint of Chest pain (08 Aug 2022 22:02)      SUBJECTIVE / OVERNIGHT EVENTS:      MEDICATIONS  (STANDING):  amLODIPine   Tablet 10 milliGRAM(s) Oral daily  atorvastatin 20 milliGRAM(s) Oral at bedtime  chlorhexidine 2% Cloths 1 Application(s) Topical daily  heparin   Injectable 5000 Unit(s) SubCutaneous every 8 hours  metoprolol tartrate 50 milliGRAM(s) Oral two times a day  sevelamer carbonate 800 milliGRAM(s) Oral three times a day with meals    MEDICATIONS  (PRN):  acetaminophen     Tablet .. 650 milliGRAM(s) Oral every 6 hours PRN Temp greater or equal to 38C (100.4F), Mild Pain (1 - 3)  aluminum hydroxide/magnesium hydroxide/simethicone Suspension 30 milliLiter(s) Oral every 4 hours PRN Dyspepsia  melatonin 3 milliGRAM(s) Oral at bedtime PRN Insomnia  ondansetron Injectable 4 milliGRAM(s) IV Push every 8 hours PRN Nausea and/or Vomiting      CAPILLARY BLOOD GLUCOSE        I&O's Summary      PHYSICAL EXAM:  Vital Signs Last 24 Hrs  T(C): 37 (09 Aug 2022 06:55), Max: 39.3 (08 Aug 2022 21:25)  T(F): 98.6 (09 Aug 2022 06:55), Max: 102.7 (08 Aug 2022 21:25)  HR: 64 (09 Aug 2022 06:55) (58 - 69)  BP: 153/77 (09 Aug 2022 06:55) (104/63 - 154/70)  BP(mean): --  RR: 18 (09 Aug 2022 06:55) (16 - 18)  SpO2: 96% (09 Aug 2022 06:12) (93% - 100%)    Parameters below as of 09 Aug 2022 06:55  Patient On (Oxygen Delivery Method): nasal cannula  O2 Flow (L/min): 4      CONSTITUTIONAL: NAD, well-developed, well-groomed  EYES: EOMI; conjunctiva and sclera clear  ENMT: Moist oral mucosa  RESPIRATORY: Normal respiratory effort; lungs are clear to auscultation bilaterally  CARDIOVASCULAR: Regular rate and rhythm, normal S1 and S2, no murmur; No lower extremity edema  ABDOMEN: Nontender to palpation, normoactive bowel sounds, no rebound/guarding  MUSCULOSKELETAL:   no clubbing or cyanosis of digits; no joint swelling or tenderness to palpation  PSYCH: A+O to person, place, and time; affect appropriate  NEUROLOGY: CN 2-12 are intact and symmetric; no gross sensory deficits   SKIN: No rashes; no palpable lesions    LABS:                        12.8   11.21 )-----------( 219      ( 09 Aug 2022 06:13 )             38.4     08-09    139  |  98  |  67<H>  ----------------------------<  94  4.4   |  22  |  13.77<H>    Ca    10.1      09 Aug 2022 06:13  Phos  4.7     08-08  Mg     2.40     08-08    TPro  6.3  /  Alb  3.5  /  TBili  0.8  /  DBili  x   /  AST  11  /  ALT  6   /  AlkPhos  160<H>  08-09    PT/INR - ( 09 Aug 2022 06:13 )   PT: 15.1 sec;   INR: 1.30 ratio         PTT - ( 08 Aug 2022 17:30 )  PTT:35.9 sec          RADIOLOGY & ADDITIONAL TESTS:  Results Reviewed:   Imaging Personally Reviewed:  Electrocardiogram Personally Reviewed:    COORDINATION OF CARE:  Care Discussed with Consultants/Other Providers [Y/N]: Y  Prior or Outpatient Records Reviewed [Y/N]: Y   [facility] of Hospital Medicine  Rebecca Her MD  Pager (M-F, 8A-5P): [phone]  Other Times:  [id number]    Patient is a 81y old  Female who presents with a chief complaint of Chest pain (08 Aug 2022 22:02)      SUBJECTIVE / OVERNIGHT EVENTS:  Pt feels well.  She underwent HD earlier today - denies chest pain or sob.      MEDICATIONS  (STANDING):  amLODIPine   Tablet 10 milliGRAM(s) Oral daily  atorvastatin 20 milliGRAM(s) Oral at bedtime  chlorhexidine 2% Cloths 1 Application(s) Topical daily  heparin   Injectable 5000 Unit(s) SubCutaneous every 8 hours  metoprolol tartrate 50 milliGRAM(s) Oral two times a day  sevelamer carbonate 800 milliGRAM(s) Oral three times a day with meals    MEDICATIONS  (PRN):  acetaminophen     Tablet .. 650 milliGRAM(s) Oral every 6 hours PRN Temp greater or equal to 38C (100.4F), Mild Pain (1 - 3)  aluminum hydroxide/magnesium hydroxide/simethicone Suspension 30 milliLiter(s) Oral every 4 hours PRN Dyspepsia  melatonin 3 milliGRAM(s) Oral at bedtime PRN Insomnia  ondansetron Injectable 4 milliGRAM(s) IV Push every 8 hours PRN Nausea and/or Vomiting      CAPILLARY BLOOD GLUCOSE        I&O's Summary      PHYSICAL EXAM:  Vital Signs Last 24 Hrs  T(C): 37 (09 Aug 2022 06:55), Max: 39.3 (08 Aug 2022 21:25)  T(F): 98.6 (09 Aug 2022 06:55), Max: 102.7 (08 Aug 2022 21:25)  HR: 64 (09 Aug 2022 06:55) (58 - 69)  BP: 153/77 (09 Aug 2022 06:55) (104/63 - 154/70)  BP(mean): --  RR: 18 (09 Aug 2022 06:55) (16 - 18)  SpO2: 96% (09 Aug 2022 06:12) (93% - 100%)    Parameters below as of 09 Aug 2022 06:55  Patient On (Oxygen Delivery Method): nasal cannula  O2 Flow (L/min): 4      CONSTITUTIONAL: NAD, well-developed, well-groomed  EYES: EOMI; conjunctiva and sclera clear  ENMT: Moist oral mucosa  RESPIRATORY: Normal respiratory effort; lungs are clear to auscultation bilaterally  CARDIOVASCULAR: Regular rate and rhythm, normal S1 and S2, no murmur; No lower extremity edema  ABDOMEN: Nontender to palpation, normoactive bowel sounds, no rebound/guarding  MUSCULOSKELETAL:   no clubbing or cyanosis of digits; no joint swelling or tenderness to palpation  PSYCH: A+O to person, place, and time; affect appropriate  NEUROLOGY: CN 2-12 are intact and symmetric; no gross sensory deficits   SKIN: R groin catheter; RUE AVF     LABS:                        12.8   11.21 )-----------( 219      ( 09 Aug 2022 06:13 )             38.4     08-09    139  |  98  |  67<H>  ----------------------------<  94  4.4   |  22  |  13.77<H>    Ca    10.1      09 Aug 2022 06:13  Phos  4.7     08-08  Mg     2.40     08-08    TPro  6.3  /  Alb  3.5  /  TBili  0.8  /  DBili  x   /  AST  11  /  ALT  6   /  AlkPhos  160<H>  08-09    PT/INR - ( 09 Aug 2022 06:13 )   PT: 15.1 sec;   INR: 1.30 ratio         PTT - ( 08 Aug 2022 17:30 )  PTT:35.9 sec          RADIOLOGY & ADDITIONAL TESTS:  Results Reviewed:   Imaging Personally Reviewed:  Electrocardiogram Personally Reviewed:    COORDINATION OF CARE:  Care Discussed with Consultants/Other Providers [Y/N]: Y  Prior or Outpatient Records Reviewed [Y/N]: Y

## 2022-08-09 NOTE — PATIENT PROFILE ADULT - FALL HARM RISK - HARM RISK INTERVENTIONS

## 2022-08-10 PROCEDURE — 71250 CT THORAX DX C-: CPT | Mod: 26

## 2022-08-10 PROCEDURE — 99233 SBSQ HOSP IP/OBS HIGH 50: CPT

## 2022-08-10 PROCEDURE — 93306 TTE W/DOPPLER COMPLETE: CPT | Mod: 26

## 2022-08-10 RX ADMIN — HEPARIN SODIUM 5000 UNIT(S): 5000 INJECTION INTRAVENOUS; SUBCUTANEOUS at 21:38

## 2022-08-10 RX ADMIN — Medication 50 MILLIGRAM(S): at 05:42

## 2022-08-10 RX ADMIN — SEVELAMER CARBONATE 800 MILLIGRAM(S): 2400 POWDER, FOR SUSPENSION ORAL at 17:31

## 2022-08-10 RX ADMIN — CHLORHEXIDINE GLUCONATE 1 APPLICATION(S): 213 SOLUTION TOPICAL at 12:23

## 2022-08-10 RX ADMIN — HEPARIN SODIUM 5000 UNIT(S): 5000 INJECTION INTRAVENOUS; SUBCUTANEOUS at 13:22

## 2022-08-10 RX ADMIN — SEVELAMER CARBONATE 800 MILLIGRAM(S): 2400 POWDER, FOR SUSPENSION ORAL at 09:03

## 2022-08-10 RX ADMIN — HEPARIN SODIUM 5000 UNIT(S): 5000 INJECTION INTRAVENOUS; SUBCUTANEOUS at 05:43

## 2022-08-10 RX ADMIN — Medication 50 MILLIGRAM(S): at 17:31

## 2022-08-10 RX ADMIN — AMLODIPINE BESYLATE 10 MILLIGRAM(S): 2.5 TABLET ORAL at 05:42

## 2022-08-10 RX ADMIN — ATORVASTATIN CALCIUM 20 MILLIGRAM(S): 80 TABLET, FILM COATED ORAL at 21:38

## 2022-08-10 RX ADMIN — SEVELAMER CARBONATE 800 MILLIGRAM(S): 2400 POWDER, FOR SUSPENSION ORAL at 13:27

## 2022-08-10 NOTE — PROGRESS NOTE ADULT - SUBJECTIVE AND OBJECTIVE BOX
[facility] Division of Hospital Medicine  Rebecca Her MD  Pager (M-F, 8A-5P): [phone]  Other Times:  [id number]    Patient is a 81y old  Female who presents with a chief complaint of Chest pain (09 Aug 2022 16:27)      SUBJECTIVE / OVERNIGHT EVENTS:      MEDICATIONS  (STANDING):  amLODIPine   Tablet 10 milliGRAM(s) Oral daily  atorvastatin 20 milliGRAM(s) Oral at bedtime  chlorhexidine 2% Cloths 1 Application(s) Topical daily  heparin   Injectable 5000 Unit(s) SubCutaneous every 8 hours  metoprolol tartrate 50 milliGRAM(s) Oral two times a day  sevelamer carbonate 800 milliGRAM(s) Oral three times a day with meals    MEDICATIONS  (PRN):  acetaminophen     Tablet .. 650 milliGRAM(s) Oral every 6 hours PRN Temp greater or equal to 38C (100.4F), Mild Pain (1 - 3)  aluminum hydroxide/magnesium hydroxide/simethicone Suspension 30 milliLiter(s) Oral every 4 hours PRN Dyspepsia  melatonin 3 milliGRAM(s) Oral at bedtime PRN Insomnia  ondansetron Injectable 4 milliGRAM(s) IV Push every 8 hours PRN Nausea and/or Vomiting      CAPILLARY BLOOD GLUCOSE        I&O's Summary    09 Aug 2022 07:01  -  10 Aug 2022 07:00  --------------------------------------------------------  IN: 400 mL / OUT: 2400 mL / NET: -2000 mL        PHYSICAL EXAM:  Vital Signs Last 24 Hrs  T(C): 36.6 (10 Aug 2022 09:00), Max: 37.2 (10 Aug 2022 02:38)  T(F): 97.9 (10 Aug 2022 09:00), Max: 99 (10 Aug 2022 02:38)  HR: 100 (10 Aug 2022 09:00) (72 - 100)  BP: 126/68 (10 Aug 2022 09:00) (120/70 - 152/65)  BP(mean): --  RR: 18 (10 Aug 2022 09:00) (17 - 18)  SpO2: 94% (10 Aug 2022 09:00) (94% - 100%)    Parameters below as of 10 Aug 2022 09:00  Patient On (Oxygen Delivery Method): room air        CONSTITUTIONAL: NAD, well-developed, well-groomed  EYES: EOMI; conjunctiva and sclera clear  ENMT: Moist oral mucosa  RESPIRATORY: Normal respiratory effort; lungs are clear to auscultation bilaterally  CARDIOVASCULAR: Regular rate and rhythm, normal S1 and S2, no murmur; No lower extremity edema  ABDOMEN: Nontender to palpation, normoactive bowel sounds, no rebound/guarding  MUSCULOSKELETAL:   no clubbing or cyanosis of digits; no joint swelling or tenderness to palpation  PSYCH: A+O to person, place, and time; affect appropriate  NEUROLOGY: CN 2-12 are intact and symmetric; no gross sensory deficits   SKIN: No rashes; no palpable lesions    LABS:                        12.8   11.21 )-----------( 219      ( 09 Aug 2022 06:13 )             38.4     08-09    139  |  98  |  67<H>  ----------------------------<  94  4.4   |  22  |  13.77<H>    Ca    10.1      09 Aug 2022 06:13  Phos  4.7     08-08  Mg     2.40     08-08    TPro  6.3  /  Alb  3.5  /  TBili  0.8  /  DBili  x   /  AST  11  /  ALT  6   /  AlkPhos  160<H>  08-09    PT/INR - ( 09 Aug 2022 06:13 )   PT: 15.1 sec;   INR: 1.30 ratio         PTT - ( 08 Aug 2022 17:30 )  PTT:35.9 sec          RADIOLOGY & ADDITIONAL TESTS:  Results Reviewed:   Imaging Personally Reviewed:  Electrocardiogram Personally Reviewed:    COORDINATION OF CARE:  Care Discussed with Consultants/Other Providers [Y/N]: Y  Prior or Outpatient Records Reviewed [Y/N]: Y   [facility]  Rebecca Her MD  Pager (M-F, 8A-5P): [phone]  Other Times:  [id number]    Patient is a 81y old  Female who presents with a chief complaint of Chest pain (09 Aug 2022 16:27)      SUBJECTIVE / OVERNIGHT EVENTS:  Pt denies pain or chest discomfort.  no pain on RUE     MEDICATIONS  (STANDING):  amLODIPine   Tablet 10 milliGRAM(s) Oral daily  atorvastatin 20 milliGRAM(s) Oral at bedtime  chlorhexidine 2% Cloths 1 Application(s) Topical daily  heparin   Injectable 5000 Unit(s) SubCutaneous every 8 hours  metoprolol tartrate 50 milliGRAM(s) Oral two times a day  sevelamer carbonate 800 milliGRAM(s) Oral three times a day with meals    MEDICATIONS  (PRN):  acetaminophen     Tablet .. 650 milliGRAM(s) Oral every 6 hours PRN Temp greater or equal to 38C (100.4F), Mild Pain (1 - 3)  aluminum hydroxide/magnesium hydroxide/simethicone Suspension 30 milliLiter(s) Oral every 4 hours PRN Dyspepsia  melatonin 3 milliGRAM(s) Oral at bedtime PRN Insomnia  ondansetron Injectable 4 milliGRAM(s) IV Push every 8 hours PRN Nausea and/or Vomiting      CAPILLARY BLOOD GLUCOSE        I&O's Summary    09 Aug 2022 07:01  -  10 Aug 2022 07:00  --------------------------------------------------------  IN: 400 mL / OUT: 2400 mL / NET: -2000 mL        PHYSICAL EXAM:  Vital Signs Last 24 Hrs  T(C): 36.6 (10 Aug 2022 09:00), Max: 37.2 (10 Aug 2022 02:38)  T(F): 97.9 (10 Aug 2022 09:00), Max: 99 (10 Aug 2022 02:38)  HR: 100 (10 Aug 2022 09:00) (72 - 100)  BP: 126/68 (10 Aug 2022 09:00) (120/70 - 152/65)  BP(mean): --  RR: 18 (10 Aug 2022 09:00) (17 - 18)  SpO2: 94% (10 Aug 2022 09:00) (94% - 100%)    Parameters below as of 10 Aug 2022 09:00  Patient On (Oxygen Delivery Method): room air        CONSTITUTIONAL: NAD, well-developed, well-groomed  EYES: EOMI; conjunctiva and sclera clear  ENMT: Moist oral mucosa  RESPIRATORY: Normal respiratory effort; lungs are clear to auscultation bilaterally  CARDIOVASCULAR: Regular rate and rhythm, normal S1 and S2, no murmur; No lower extremity edema  ABDOMEN: Nontender to palpation, normoactive bowel sounds, no rebound/guarding  MUSCULOSKELETAL:   no clubbing or cyanosis of digits; no joint swelling or tenderness to palpation  PSYCH: A+O to person, place, and time; affect appropriate  NEUROLOGY: CN 2-12 are intact and symmetric; no gross sensory deficits   SKIN:RUE with AVf; R margaritain marcia   LABS:                        12.8   11.21 )-----------( 219      ( 09 Aug 2022 06:13 )             38.4     08-09    139  |  98  |  67<H>  ----------------------------<  94  4.4   |  22  |  13.77<H>    Ca    10.1      09 Aug 2022 06:13  Phos  4.7     08-08  Mg     2.40     08-08    TPro  6.3  /  Alb  3.5  /  TBili  0.8  /  DBili  x   /  AST  11  /  ALT  6   /  AlkPhos  160<H>  08-09    PT/INR - ( 09 Aug 2022 06:13 )   PT: 15.1 sec;   INR: 1.30 ratio         PTT - ( 08 Aug 2022 17:30 )  PTT:35.9 sec          RADIOLOGY & ADDITIONAL TESTS:  Results Reviewed:   Imaging Personally Reviewed:  Electrocardiogram Personally Reviewed:    COORDINATION OF CARE:  Care Discussed with Consultants/Other Providers [Y/N]: Y  Prior or Outpatient Records Reviewed [Y/N]: Y

## 2022-08-10 NOTE — PROGRESS NOTE ADULT - ASSESSMENT
81y F PMH HTN, Left arm lymphedema, ESRD on HD via RUE AVF (Tu/Th/Sa), presenting with malfunctioning AVF and chest pain s/p TPA injection into AVF.     RUE AVF duplex appreciated.    Plan:  - Please obtain vein mapping  - HD via [facility]  - Remainder of care per primary team      Vascular (C Team) Surgery  [id number]

## 2022-08-10 NOTE — PROGRESS NOTE ADULT - PROBLEM SELECTOR PLAN 4
- appreciate vascular follow up  - Duplex reviewed - will need vein mapping  - continue to use R groin Shiley as HD access

## 2022-08-10 NOTE — PROGRESS NOTE ADULT - SUBJECTIVE AND OBJECTIVE BOX
[facility] Division of Hospital Medicine  Rebecca Her MD  Pager (M-F, 8A-5P): [phone]  Other Times:  [phone]    Patient is a 81y old  Female who presents with a chief complaint of Chest pain (10 Aug 2022 10:18)      SUBJECTIVE / OVERNIGHT EVENTS:      MEDICATIONS  (STANDING):  amLODIPine   Tablet 10 milliGRAM(s) Oral daily  atorvastatin 20 milliGRAM(s) Oral at bedtime  chlorhexidine 2% Cloths 1 Application(s) Topical daily  heparin   Injectable 5000 Unit(s) SubCutaneous every 8 hours  metoprolol tartrate 50 milliGRAM(s) Oral two times a day  sevelamer carbonate 800 milliGRAM(s) Oral three times a day with meals    MEDICATIONS  (PRN):  acetaminophen     Tablet .. 650 milliGRAM(s) Oral every 6 hours PRN Temp greater or equal to 38C (100.4F), Mild Pain (1 - 3)  aluminum hydroxide/magnesium hydroxide/simethicone Suspension 30 milliLiter(s) Oral every 4 hours PRN Dyspepsia  melatonin 3 milliGRAM(s) Oral at bedtime PRN Insomnia  ondansetron Injectable 4 milliGRAM(s) IV Push every 8 hours PRN Nausea and/or Vomiting      CAPILLARY BLOOD GLUCOSE        I&O's Summary    09 Aug 2022 07:01  -  10 Aug 2022 07:00  --------------------------------------------------------  IN: 400 mL / OUT: 2400 mL / NET: -2000 mL        PHYSICAL EXAM:  Vital Signs Last 24 Hrs  T(C): 36.6 (10 Aug 2022 09:00), Max: 37.2 (10 Aug 2022 02:38)  T(F): 97.9 (10 Aug 2022 09:00), Max: 99 (10 Aug 2022 02:38)  HR: 100 (10 Aug 2022 09:00) (72 - 100)  BP: 126/68 (10 Aug 2022 09:00) (120/70 - 152/65)  BP(mean): --  RR: 18 (10 Aug 2022 09:00) (17 - 18)  SpO2: 94% (10 Aug 2022 09:00) (94% - 100%)    Parameters below as of 10 Aug 2022 09:00  Patient On (Oxygen Delivery Method): room air        CONSTITUTIONAL: NAD, well-developed, well-groomed  EYES: EOMI; conjunctiva and sclera clear  ENMT: Moist oral mucosa  RESPIRATORY: Normal respiratory effort; lungs are clear to auscultation bilaterally  CARDIOVASCULAR: Regular rate and rhythm, normal S1 and S2, no murmur; No lower extremity edema  ABDOMEN: Nontender to palpation, normoactive bowel sounds, no rebound/guarding  MUSCULOSKELETAL:   no clubbing or cyanosis of digits; no joint swelling or tenderness to palpation  PSYCH: A+O to person, place, and time; affect appropriate  NEUROLOGY: CN 2-12 are intact and symmetric; no gross sensory deficits   SKIN: No rashes; no palpable lesions    LABS:                        12.8   11.21 )-----------( 219      ( 09 Aug 2022 06:13 )             38.4     08-09    139  |  98  |  67<H>  ----------------------------<  94  4.4   |  22  |  13.77<H>    Ca    10.1      09 Aug 2022 06:13  Phos  4.7     08-08  Mg     2.40     08-08    TPro  6.3  /  Alb  3.5  /  TBili  0.8  /  DBili  x   /  AST  11  /  ALT  6   /  AlkPhos  160<H>  08-09    PT/INR - ( 09 Aug 2022 06:13 )   PT: 15.1 sec;   INR: 1.30 ratio         PTT - ( 08 Aug 2022 17:30 )  PTT:35.9 sec          RADIOLOGY & ADDITIONAL TESTS:  Results Reviewed:   Imaging Personally Reviewed:  Electrocardiogram Personally Reviewed:    COORDINATION OF CARE:  Care Discussed with Consultants/Other Providers [Y/N]: Y  Prior or Outpatient Records Reviewed [Y/N]: Y

## 2022-08-10 NOTE — PROGRESS NOTE ADULT - SUBJECTIVE AND OBJECTIVE BOX
Vascular Surgery Progress Note    Subjective/24hour Events:   Patient seen and examined.      Vital Signs:  Vital Signs Last 24 Hrs  T(C): 36.7 (10 Aug 2022 13:00), Max: 37.2 (10 Aug 2022 02:38)  T(F): 98.1 (10 Aug 2022 13:00), Max: 99 (10 Aug 2022 02:38)  HR: 77 (10 Aug 2022 13:00) (73 - 100)  BP: 125/64 (10 Aug 2022 13:00) (125/64 - 152/65)  BP(mean): --  RR: 18 (10 Aug 2022 13:00) (17 - 18)  SpO2: 97% (10 Aug 2022 13:00) (94% - 100%)    Parameters below as of 10 Aug 2022 13:00  Patient On (Oxygen Delivery Method): room air        CAPILLARY BLOOD GLUCOSE          I&O's Detail    09 Aug 2022 07:01  -  10 Aug 2022 07:00  --------------------------------------------------------  IN:    Other (mL): 400 mL  Total IN: 400 mL    OUT:    Other (mL): 2400 mL  Total OUT: 2400 mL    Total NET: -2000 mL          Physical Exam:  Gen: NAD, resting comfortably in bed  CV: Regular rate  Resp: Nonlabored breathing on room air  Ext: RUE with c/d/i dressing over sutured areas of AVF at former angiocath sites  Vasc: Thrill or pulse not palpated in fistula        Labs:    08-09    139  |  98  |  67<H>  ----------------------------<  94  4.4   |  22  |  13.77<H>    Ca    10.1      09 Aug 2022 06:13  Phos  4.7     08-08  Mg     2.40     08-08    TPro  6.3  /  Alb  3.5  /  TBili  0.8  /  DBili  x   /  AST  11  /  ALT  6   /  AlkPhos  160<H>  08-09    LIVER FUNCTIONS - ( 09 Aug 2022 06:13 )  Alb: 3.5 g/dL / Pro: 6.3 g/dL / ALK PHOS: 160 U/L / ALT: 6 U/L / AST: 11 U/L / GGT: x                                 12.8   11.21 )-----------( 219      ( 09 Aug 2022 06:13 )             38.4     PT/INR - ( 09 Aug 2022 06:13 )   PT: 15.1 sec;   INR: 1.30 ratio         PTT - ( 08 Aug 2022 17:30 )  PTT:35.9 sec

## 2022-08-11 LAB
ALBUMIN SERPL ELPH-MCNC: 3.5 G/DL — SIGNIFICANT CHANGE UP (ref 3.3–5)
ALP SERPL-CCNC: 154 U/L — HIGH (ref 40–120)
ALT FLD-CCNC: 5 U/L — SIGNIFICANT CHANGE UP (ref 4–33)
ANION GAP SERPL CALC-SCNC: 14 MMOL/L — SIGNIFICANT CHANGE UP (ref 7–14)
AST SERPL-CCNC: 11 U/L — SIGNIFICANT CHANGE UP (ref 4–32)
BASOPHILS # BLD AUTO: 0.02 K/UL — SIGNIFICANT CHANGE UP (ref 0–0.2)
BASOPHILS NFR BLD AUTO: 0.3 % — SIGNIFICANT CHANGE UP (ref 0–2)
BILIRUB SERPL-MCNC: 0.5 MG/DL — SIGNIFICANT CHANGE UP (ref 0.2–1.2)
BLD GP AB SCN SERPL QL: NEGATIVE — SIGNIFICANT CHANGE UP
BUN SERPL-MCNC: 37 MG/DL — HIGH (ref 7–23)
CALCIUM SERPL-MCNC: 9.4 MG/DL — SIGNIFICANT CHANGE UP (ref 8.4–10.5)
CHLORIDE SERPL-SCNC: 98 MMOL/L — SIGNIFICANT CHANGE UP (ref 98–107)
CO2 SERPL-SCNC: 27 MMOL/L — SIGNIFICANT CHANGE UP (ref 22–31)
CREAT SERPL-MCNC: 8.2 MG/DL — HIGH (ref 0.5–1.3)
EGFR: 4 ML/MIN/1.73M2 — LOW
EOSINOPHIL # BLD AUTO: 0.13 K/UL — SIGNIFICANT CHANGE UP (ref 0–0.5)
EOSINOPHIL NFR BLD AUTO: 1.8 % — SIGNIFICANT CHANGE UP (ref 0–6)
GLUCOSE SERPL-MCNC: 94 MG/DL — SIGNIFICANT CHANGE UP (ref 70–99)
HCT VFR BLD CALC: 40 % — SIGNIFICANT CHANGE UP (ref 34.5–45)
HGB BLD-MCNC: 13.1 G/DL — SIGNIFICANT CHANGE UP (ref 11.5–15.5)
IANC: 5.39 K/UL — SIGNIFICANT CHANGE UP (ref 1.8–7.4)
IMM GRANULOCYTES NFR BLD AUTO: 0.7 % — SIGNIFICANT CHANGE UP (ref 0–1.5)
INR BLD: 1.04 RATIO — SIGNIFICANT CHANGE UP (ref 0.88–1.16)
LYMPHOCYTES # BLD AUTO: 1.05 K/UL — SIGNIFICANT CHANGE UP (ref 1–3.3)
LYMPHOCYTES # BLD AUTO: 14.2 % — SIGNIFICANT CHANGE UP (ref 13–44)
MAGNESIUM SERPL-MCNC: 2.2 MG/DL — SIGNIFICANT CHANGE UP (ref 1.6–2.6)
MCHC RBC-ENTMCNC: 30.4 PG — SIGNIFICANT CHANGE UP (ref 27–34)
MCHC RBC-ENTMCNC: 32.8 GM/DL — SIGNIFICANT CHANGE UP (ref 32–36)
MCV RBC AUTO: 92.8 FL — SIGNIFICANT CHANGE UP (ref 80–100)
MONOCYTES # BLD AUTO: 0.78 K/UL — SIGNIFICANT CHANGE UP (ref 0–0.9)
MONOCYTES NFR BLD AUTO: 10.5 % — SIGNIFICANT CHANGE UP (ref 2–14)
NEUTROPHILS # BLD AUTO: 5.39 K/UL — SIGNIFICANT CHANGE UP (ref 1.8–7.4)
NEUTROPHILS NFR BLD AUTO: 72.5 % — SIGNIFICANT CHANGE UP (ref 43–77)
NRBC # BLD: 0 /100 WBCS — SIGNIFICANT CHANGE UP
NRBC # FLD: 0 K/UL — SIGNIFICANT CHANGE UP
PHOSPHATE SERPL-MCNC: 2.3 MG/DL — LOW (ref 2.5–4.5)
PLATELET # BLD AUTO: 256 K/UL — SIGNIFICANT CHANGE UP (ref 150–400)
POTASSIUM SERPL-MCNC: 2.9 MMOL/L — CRITICAL LOW (ref 3.5–5.3)
POTASSIUM SERPL-SCNC: 2.9 MMOL/L — CRITICAL LOW (ref 3.5–5.3)
PROT SERPL-MCNC: 6.9 G/DL — SIGNIFICANT CHANGE UP (ref 6–8.3)
PROTHROM AB SERPL-ACNC: 12.1 SEC — SIGNIFICANT CHANGE UP (ref 10.5–13.4)
RBC # BLD: 4.31 M/UL — SIGNIFICANT CHANGE UP (ref 3.8–5.2)
RBC # FLD: 16.1 % — HIGH (ref 10.3–14.5)
RH IG SCN BLD-IMP: POSITIVE — SIGNIFICANT CHANGE UP
SODIUM SERPL-SCNC: 139 MMOL/L — SIGNIFICANT CHANGE UP (ref 135–145)
WBC # BLD: 7.42 K/UL — SIGNIFICANT CHANGE UP (ref 3.8–10.5)
WBC # FLD AUTO: 7.42 K/UL — SIGNIFICANT CHANGE UP (ref 3.8–10.5)

## 2022-08-11 PROCEDURE — 99233 SBSQ HOSP IP/OBS HIGH 50: CPT

## 2022-08-11 PROCEDURE — 99232 SBSQ HOSP IP/OBS MODERATE 35: CPT

## 2022-08-11 RX ADMIN — HEPARIN SODIUM 5000 UNIT(S): 5000 INJECTION INTRAVENOUS; SUBCUTANEOUS at 21:08

## 2022-08-11 RX ADMIN — Medication 50 MILLIGRAM(S): at 12:29

## 2022-08-11 RX ADMIN — HEPARIN SODIUM 5000 UNIT(S): 5000 INJECTION INTRAVENOUS; SUBCUTANEOUS at 05:09

## 2022-08-11 RX ADMIN — Medication 50 MILLIGRAM(S): at 21:08

## 2022-08-11 RX ADMIN — ATORVASTATIN CALCIUM 20 MILLIGRAM(S): 80 TABLET, FILM COATED ORAL at 21:08

## 2022-08-11 RX ADMIN — HEPARIN SODIUM 5000 UNIT(S): 5000 INJECTION INTRAVENOUS; SUBCUTANEOUS at 12:29

## 2022-08-11 RX ADMIN — CHLORHEXIDINE GLUCONATE 1 APPLICATION(S): 213 SOLUTION TOPICAL at 12:30

## 2022-08-11 NOTE — CHART NOTE - NSCHARTNOTEFT_GEN_A_CORE
Pt with hypokalemia and hypophosphatemia on labs this morning, drawn prior to HD. Per renal supplements placed in dialysis bath however pt unable to tolerate full session of HD due to low BP. Pt on bilateral arm precautions 2/2 lymphadema in one arm and AVF in the other there for labs drawn in HD. Per Renal, hold off on additional potassium or phos supplements at this time. In AM, will call HD RN to ask if they could draw BMP off AVF.

## 2022-08-11 NOTE — PROGRESS NOTE ADULT - ASSESSMENT
81y F PMH HTN, Left arm lymphedema, ESRD on HD via RUE AVF (Tu/Th/Sa), presenting with malfunctioning AVF and chest pain s/p TPA injection into AVF.     RUE AVF duplex appreciated.    Plan:  - Please obtain vein mapping  - Plan for new AVF creation next week  - Would appreciate medical and cardiac optimization  - HD via R groin Shiley  - Remainder of care per primary team      Vascular (C Team) Surgery  [id number]

## 2022-08-11 NOTE — PROGRESS NOTE ADULT - SUBJECTIVE AND OBJECTIVE BOX
[facility] Division of Hospital Medicine  Rebecca Her MD  Pager (M-F, 8A-5P): [phone]  Other Times:  [phone]    Patient is a 81y old  Female who presents with a chief complaint of Chest pain (10 Aug 2022 15:46)      SUBJECTIVE / OVERNIGHT EVENTS:      MEDICATIONS  (STANDING):  amLODIPine   Tablet 10 milliGRAM(s) Oral daily  atorvastatin 20 milliGRAM(s) Oral at bedtime  chlorhexidine 2% Cloths 1 Application(s) Topical daily  heparin   Injectable 5000 Unit(s) SubCutaneous every 8 hours  metoprolol tartrate 50 milliGRAM(s) Oral two times a day  sevelamer carbonate 800 milliGRAM(s) Oral three times a day with meals    MEDICATIONS  (PRN):  acetaminophen     Tablet .. 650 milliGRAM(s) Oral every 6 hours PRN Temp greater or equal to 38C (100.4F), Mild Pain (1 - 3)  aluminum hydroxide/magnesium hydroxide/simethicone Suspension 30 milliLiter(s) Oral every 4 hours PRN Dyspepsia  melatonin 3 milliGRAM(s) Oral at bedtime PRN Insomnia  ondansetron Injectable 4 milliGRAM(s) IV Push every 8 hours PRN Nausea and/or Vomiting      CAPILLARY BLOOD GLUCOSE        I&O's Summary    11 Aug 2022 07:01  -  11 Aug 2022 10:26  --------------------------------------------------------  IN: 600 mL / OUT: 700 mL / NET: -100 mL        PHYSICAL EXAM:  Vital Signs Last 24 Hrs  T(C): 36.4 (11 Aug 2022 08:19), Max: 36.8 (10 Aug 2022 17:00)  T(F): 97.5 (11 Aug 2022 08:19), Max: 98.3 (10 Aug 2022 20:10)  HR: 98 (11 Aug 2022 08:19) (66 - 98)  BP: 91/50 (11 Aug 2022 08:19) (91/50 - 131/66)  BP(mean): --  RR: 18 (11 Aug 2022 08:19) (18 - 19)  SpO2: 98% (11 Aug 2022 08:19) (93% - 99%)    Parameters below as of 11 Aug 2022 08:19  Patient On (Oxygen Delivery Method): room air        CONSTITUTIONAL: NAD, well-developed, well-groomed  EYES: EOMI; conjunctiva and sclera clear  ENMT: Moist oral mucosa  RESPIRATORY: Normal respiratory effort; lungs are clear to auscultation bilaterally  CARDIOVASCULAR: Regular rate and rhythm, normal S1 and S2, no murmur; No lower extremity edema  ABDOMEN: Nontender to palpation, normoactive bowel sounds, no rebound/guarding  MUSCULOSKELETAL:   no clubbing or cyanosis of digits; no joint swelling or tenderness to palpation  PSYCH: A+O to person, place, and time; affect appropriate  NEUROLOGY: CN 2-12 are intact and symmetric; no gross sensory deficits   SKIN: No rashes; no palpable lesions    LABS:                        13.1   7.42  )-----------( 256      ( 11 Aug 2022 06:35 )             40.0     08-11    139  |  98  |  37<H>  ----------------------------<  94  2.9<LL>   |  27  |  8.20<H>    Ca    9.4      11 Aug 2022 06:35  Phos  2.3     08-11  Mg     2.20     08-11    TPro  6.9  /  Alb  3.5  /  TBili  0.5  /  DBili  x   /  AST  11  /  ALT  5   /  AlkPhos  154<H>  08-11    PT/INR - ( 11 Aug 2022 06:35 )   PT: 12.1 sec;   INR: 1.04 ratio                   RADIOLOGY & ADDITIONAL TESTS:  Results Reviewed:   Imaging Personally Reviewed:  Electrocardiogram Personally Reviewed:    COORDINATION OF CARE:  Care Discussed with Consultants/Other Providers [Y/N]: Y  Prior or Outpatient Records Reviewed [Y/N]: Y   [facility] Division of Hospital Medicine  Rebecca Her MD  Pager (M-F, 8A-5P): [phone]  Other Times:  [phone]    Patient is a 81y old  Female who presents with a chief complaint of Chest pain (10 Aug 2022 15:46)      SUBJECTIVE / OVERNIGHT EVENTS:  pt without complaints    MEDICATIONS  (STANDING):  amLODIPine   Tablet 10 milliGRAM(s) Oral daily  atorvastatin 20 milliGRAM(s) Oral at bedtime  chlorhexidine 2% Cloths 1 Application(s) Topical daily  heparin   Injectable 5000 Unit(s) SubCutaneous every 8 hours  metoprolol tartrate 50 milliGRAM(s) Oral two times a day  sevelamer carbonate 800 milliGRAM(s) Oral three times a day with meals    MEDICATIONS  (PRN):  acetaminophen     Tablet .. 650 milliGRAM(s) Oral every 6 hours PRN Temp greater or equal to 38C (100.4F), Mild Pain (1 - 3)  aluminum hydroxide/magnesium hydroxide/simethicone Suspension 30 milliLiter(s) Oral every 4 hours PRN Dyspepsia  melatonin 3 milliGRAM(s) Oral at bedtime PRN Insomnia  ondansetron Injectable 4 milliGRAM(s) IV Push every 8 hours PRN Nausea and/or Vomiting      CAPILLARY BLOOD GLUCOSE        I&O's Summary    11 Aug 2022 07:01  -  11 Aug 2022 10:26  --------------------------------------------------------  IN: 600 mL / OUT: 700 mL / NET: -100 mL        PHYSICAL EXAM:  Vital Signs Last 24 Hrs  T(C): 36.4 (11 Aug 2022 08:19), Max: 36.8 (10 Aug 2022 17:00)  T(F): 97.5 (11 Aug 2022 08:19), Max: 98.3 (10 Aug 2022 20:10)  HR: 98 (11 Aug 2022 08:19) (66 - 98)  BP: 91/50 (11 Aug 2022 08:19) (91/50 - 131/66)  BP(mean): --  RR: 18 (11 Aug 2022 08:19) (18 - 19)  SpO2: 98% (11 Aug 2022 08:19) (93% - 99%)    Parameters below as of 11 Aug 2022 08:19  Patient On (Oxygen Delivery Method): room air        CONSTITUTIONAL: NAD, well-developed, well-groomed  EYES: EOMI; conjunctiva and sclera clear  ENMT: Moist oral mucosa  RESPIRATORY: Normal respiratory effort; lungs are clear to auscultation bilaterally  CARDIOVASCULAR: Regular rate and rhythm, normal S1 and S2, no murmur; No lower extremity edema  ABDOMEN: Nontender to palpation, normoactive bowel sounds, no rebound/guarding  MUSCULOSKELETAL:   no clubbing or cyanosis of digits; no joint swelling or tenderness to palpation  PSYCH: A+O to person, place, and time; affect appropriate  NEUROLOGY: CN 2-12 are intact and symmetric; no gross sensory deficits   SKIN: RUE AVF; R [facility]     LABS:                        13.1   7.42  )-----------( 256      ( 11 Aug 2022 06:35 )             40.0     08-11    139  |  98  |  37<H>  ----------------------------<  94  2.9<LL>   |  27  |  8.20<H>    Ca    9.4      11 Aug 2022 06:35  Phos  2.3     08-11  Mg     2.20     08-11    TPro  6.9  /  Alb  3.5  /  TBili  0.5  /  DBili  x   /  AST  11  /  ALT  5   /  AlkPhos  154<H>  08-11    PT/INR - ( 11 Aug 2022 06:35 )   PT: 12.1 sec;   INR: 1.04 ratio                   RADIOLOGY & ADDITIONAL TESTS:  Results Reviewed:   Imaging Personally Reviewed:  Electrocardiogram Personally Reviewed:    COORDINATION OF CARE:  Care Discussed with Consultants/Other Providers [Y/N]: Y  Prior or Outpatient Records Reviewed [Y/N]: Y

## 2022-08-11 NOTE — PROVIDER CONTACT NOTE (OTHER) - ASSESSMENT
Pt A&O X4, denies any chest pain, palpitations, sob, n/v, or dizziness. Pt asymptomatic, no acute distress noted

## 2022-08-11 NOTE — PROGRESS NOTE ADULT - SUBJECTIVE AND OBJECTIVE BOX
===========================================  [facility] NEPHROLOGY CONSULT TEAM  -----------------------------------------------------------------------------    Alex Jenkins Pls contact me via [employer] Teams today for any q's or c's about the patient!  After 5 pm or on weekends use [employer] for fellow on call    ============================================  [facility] DIVISION OF KIDNEY DISEASES AND HYPERTENSION -- [phone]  --------------------------------------------------------------------------------  8/11/22  - Chart reviewed. Pt seen and examined  - Feeling well  - HD x 1 hour only earlier as BP fell. Took AM BP meds.       PAST HISTORY  --------------------------------------------------------------------------------  PAST MEDICAL & SURGICAL HISTORY:  HTN (hypertension)  ESRD on hemodialysis  Lymphedema        FAMILY HISTORY: Multiple family members with HTN    PAST SOCIAL HISTORY: Denies ETOH, and history of smoking.    ALLERGIES & MEDICATIONS  --------------------------------------------------------------------------------  Allergies    No Known Allergies    Intolerances    MEDICATIONS  (STANDING):  amLODIPine   Tablet 10 milliGRAM(s) Oral daily  atorvastatin 20 milliGRAM(s) Oral at bedtime  chlorhexidine 2% Cloths 1 Application(s) Topical daily  heparin   Injectable 5000 Unit(s) SubCutaneous every 8 hours  metoprolol tartrate 50 milliGRAM(s) Oral two times a day  sevelamer carbonate 800 milliGRAM(s) Oral three times a day with meals    MEDICATIONS  (PRN):  acetaminophen     Tablet .. 650 milliGRAM(s) Oral every 6 hours PRN Temp greater or equal to 38C (100.4F), Mild Pain (1 - 3)  aluminum hydroxide/magnesium hydroxide/simethicone Suspension 30 milliLiter(s) Oral every 4 hours PRN Dyspepsia  melatonin 3 milliGRAM(s) Oral at bedtime PRN Insomnia  ondansetron Injectable 4 milliGRAM(s) IV Push every 8 hours PRN Nausea and/or Vomiting    REVIEW OF SYSTEMS  --------------------------------------------------------------------------------  Gen: No fevers/chills  Skin: No rashes  Head/Eyes/Ears: Normal hearing,   Respiratory: No dyspnea, cough  CV: Positive for chest pain  GI: No abdominal pain, Positive for decreased appetite  : No dysuria, hematuria  MSK: No edema  Heme: No easy bruising or bleeding  Psych: No significant depression    All other systems were reviewed and are negative, except as noted.    VITALS/PHYSICAL EXAM  --------------------------------------------------------------------------------  Vital Signs Last 24 Hrs  T(C): 36.4 (11 Aug 2022 08:19), Max: 36.8 (10 Aug 2022 17:00)  T(F): 97.5 (11 Aug 2022 08:19), Max: 98.3 (10 Aug 2022 20:10)  HR: 98 (11 Aug 2022 08:19) (66 - 98)  BP: 91/50 (11 Aug 2022 08:19) (91/50 - 131/66)  BP(mean): --  RR: 18 (11 Aug 2022 08:19) (18 - 19)  SpO2: 98% (11 Aug 2022 08:19) (93% - 99%)    Parameters below as of 11 Aug 2022 08:19  Patient On (Oxygen Delivery Method): room air    GEN: Awake, alert, NAD  HEAD: Normocephalic and atraumatic,   EYES: Anicteric sclerae, EOM's grossly intact  NECK: No JVD, no thyromegaly  PULM: Comfortable work of breathing, clear BS  CV: Pulses 2+ symmetric bilaterally, regular rate and rhythm  ABD: Not distended, soft, non-tender,   EXT: No edema, No deformities  PSYCH: Appropriate mood and affect, no suicidal ideations elicited  NEURO: No facial asymmetry, no tremors, no observed movement disorders  SKIN: No rashes or lesions on exposed skin, No jaundice  RUE PIOTR STOKES [facility]      LABS/STUDIES  --------------------------------------------------------------------------------             08-11    139  |  98  |  37<H>  ----------------------------<  94  2.9<LL>   |  27  |  8.20<H>    Ca    9.4      11 Aug 2022 06:35  Phos  2.3     08-11  Mg     2.20     08-11    TPro  6.9  /  Alb  3.5  /  TBili  0.5  /  DBili  x   /  AST  11  /  ALT  5   /  AlkPhos  154<H>  08-11

## 2022-08-11 NOTE — PROVIDER CONTACT NOTE (OTHER) - REASON
patient potassium 2.9
BMP ordered for patient, unable to draw d/t B/L arm precautions
patient afib up to 140's not sustaining, sustaining in the 110's

## 2022-08-11 NOTE — PROGRESS NOTE ADULT - SUBJECTIVE AND OBJECTIVE BOX
Vascular Surgery Progress Note    Subjective/24hour Events:   Patient seen and examined.      Vital Signs:  Vital Signs Last 24 Hrs  T(C): 36.2 (11 Aug 2022 13:15), Max: 37 (11 Aug 2022 12:29)  T(F): 97.2 (11 Aug 2022 13:15), Max: 98.6 (11 Aug 2022 12:29)  HR: 92 (11 Aug 2022 13:15) (66 - 110)  BP: 114/55 (11 Aug 2022 13:15) (91/50 - 131/66)  BP(mean): --  RR: 16 (11 Aug 2022 13:15) (16 - 19)  SpO2: 96% (11 Aug 2022 13:15) (93% - 100%)    Parameters below as of 11 Aug 2022 13:15  Patient On (Oxygen Delivery Method): room air        CAPILLARY BLOOD GLUCOSE          I&O's Detail    11 Aug 2022 07:01  -  11 Aug 2022 16:01  --------------------------------------------------------  IN:    Other (mL): 600 mL  Total IN: 600 mL    OUT:    Other (mL): 700 mL  Total OUT: 700 mL    Total NET: -100 mL          Physical Exam:  Gen: NAD, resting comfortably in bed  CV: Regular rate  Resp: Nonlabored breathing on room air  Ext: RUE with c/d/i dressing over sutured areas of AVF at former angiocath sites  Vasc: Thrill or pulse not palpated in fistula        Labs:    08-11    139  |  98  |  37<H>  ----------------------------<  94  2.9<LL>   |  27  |  8.20<H>    Ca    9.4      11 Aug 2022 06:35  Phos  2.3     08-11  Mg     2.20     08-11    TPro  6.9  /  Alb  3.5  /  TBili  0.5  /  DBili  x   /  AST  11  /  ALT  5   /  AlkPhos  154<H>  08-11    LIVER FUNCTIONS - ( 11 Aug 2022 06:35 )  Alb: 3.5 g/dL / Pro: 6.9 g/dL / ALK PHOS: 154 U/L / ALT: 5 U/L / AST: 11 U/L / GGT: x                                 13.1   7.42  )-----------( 256      ( 11 Aug 2022 06:35 )             40.0     PT/INR - ( 11 Aug 2022 06:35 )   PT: 12.1 sec;   INR: 1.04 ratio

## 2022-08-11 NOTE — PROVIDER CONTACT NOTE (OTHER) - RECOMMENDATIONS
provider made aware
provider made aware, dialysis RN Tarik contacted if able to draw blood - unable to draw blood aguila kennedy status. Educator [facility] RN contacted -RN unable to draw blood via LE -out of scope of practice
provider made aware

## 2022-08-11 NOTE — PROGRESS NOTE ADULT - ASSESSMENT
Pt with ESRD on HD admitted for AVF malfunction    =================================  # ESRD ON HD  - SCHED: TTS  - UNIT: [facility]  --- PLAN:  > HD: HD today. Orders placed and reviewed. D/w HD unit --> HD aborted after 1 hour due to symptomatic hypotension. Pt took her BP meds this AM  > NEXT HD: Will anticipate for Sat 8/13 if labs prem are stable    # HYPOTENSION: Pt reminded not to take AM BP meds during HD days  # ACCESS: Vasc on board. Pls ff up plan for AVF creation  # HYPOKALEMIA: 2.9 earlier. Tried to correct with HD but only had 1 hour of treatment because of hypotension. Recommend to repeat BMP and replete as needed  # ANEMIA: Hgb >10  # MBD: Phos is low. Hold Renvela.

## 2022-08-11 NOTE — PROVIDER CONTACT NOTE (OTHER) - ACTION/TREATMENT ORDERED:
aware
administer AM metoprolol dose now, and move 6pm metoprolol to 10pm
will f/u, no new orders at this time

## 2022-08-11 NOTE — PROGRESS NOTE ADULT - PROBLEM SELECTOR PLAN 4
- appreciate vascular- plan for new AVF next week   - Duplex reviewed - will need vein mapping  - continue to use R margaritain Shiley as HD access

## 2022-08-12 PROCEDURE — 99233 SBSQ HOSP IP/OBS HIGH 50: CPT

## 2022-08-12 PROCEDURE — 93985 DUP-SCAN HEMO COMPL BI STD: CPT | Mod: 26

## 2022-08-12 RX ADMIN — AMLODIPINE BESYLATE 10 MILLIGRAM(S): 2.5 TABLET ORAL at 05:38

## 2022-08-12 RX ADMIN — HEPARIN SODIUM 5000 UNIT(S): 5000 INJECTION INTRAVENOUS; SUBCUTANEOUS at 21:21

## 2022-08-12 RX ADMIN — HEPARIN SODIUM 5000 UNIT(S): 5000 INJECTION INTRAVENOUS; SUBCUTANEOUS at 14:42

## 2022-08-12 RX ADMIN — HEPARIN SODIUM 5000 UNIT(S): 5000 INJECTION INTRAVENOUS; SUBCUTANEOUS at 05:38

## 2022-08-12 RX ADMIN — Medication 50 MILLIGRAM(S): at 05:38

## 2022-08-12 RX ADMIN — ATORVASTATIN CALCIUM 20 MILLIGRAM(S): 80 TABLET, FILM COATED ORAL at 21:23

## 2022-08-12 RX ADMIN — Medication 50 MILLIGRAM(S): at 17:19

## 2022-08-12 RX ADMIN — CHLORHEXIDINE GLUCONATE 1 APPLICATION(S): 213 SOLUTION TOPICAL at 14:45

## 2022-08-12 NOTE — CHART NOTE - NSCHARTNOTEFT_GEN_A_CORE
Discussed with HD RN that we need bw from AVF per renal d/t hypokalemia, per HD RN floors RN can draw from pt's femoral shiley. D/w nursing manage on floors, per NM nurses on floors cannot draw from femoral shiley. This was relayed to nephro, ok to get labs tomorrow during dialysis as we have no choice. Pt asymptomatic, and eating well today.    Ashish ACP [id number]

## 2022-08-12 NOTE — PROGRESS NOTE ADULT - ASSESSMENT
81y F PMH HTN, Left arm lymphedema, ESRD on HD via RUE AVF (Tu/Th/Sa), presenting with malfunctioning AVF and chest pain s/p TPA injection into AVF.     RUE AVF duplex appreciated.    Vein mapping appreciated.    Plan:  - Plan for new AVF creation next week (likely Thursday)  - Protect SAM  - Would appreciate medical and cardiac optimization  - HD via R margaritain Verón[place]  - Remainder of care per primary team      Vascular (C Team) Surgery  [id number]

## 2022-08-12 NOTE — PROGRESS NOTE ADULT - SUBJECTIVE AND OBJECTIVE BOX
[facility] Division of Hospital Medicine  Rebecca Her MD  Pager (M-F, 8A-5P): [phone]  Other Times:  [id number]    Patient is a 81y old  Female who presents with a chief complaint of Chest pain (11 Aug 2022 16:00)      SUBJECTIVE / OVERNIGHT EVENTS:      MEDICATIONS  (STANDING):  amLODIPine   Tablet 10 milliGRAM(s) Oral daily  atorvastatin 20 milliGRAM(s) Oral at bedtime  chlorhexidine 2% Cloths 1 Application(s) Topical daily  heparin   Injectable 5000 Unit(s) SubCutaneous every 8 hours  metoprolol tartrate 50 milliGRAM(s) Oral two times a day    MEDICATIONS  (PRN):  acetaminophen     Tablet .. 650 milliGRAM(s) Oral every 6 hours PRN Temp greater or equal to 38C (100.4F), Mild Pain (1 - 3)  aluminum hydroxide/magnesium hydroxide/simethicone Suspension 30 milliLiter(s) Oral every 4 hours PRN Dyspepsia  melatonin 3 milliGRAM(s) Oral at bedtime PRN Insomnia  ondansetron Injectable 4 milliGRAM(s) IV Push every 8 hours PRN Nausea and/or Vomiting      CAPILLARY BLOOD GLUCOSE        I&O's Summary    11 Aug 2022 07:01  -  12 Aug 2022 07:00  --------------------------------------------------------  IN: 600 mL / OUT: 700 mL / NET: -100 mL        PHYSICAL EXAM:  Vital Signs Last 24 Hrs  T(C): 36.3 (12 Aug 2022 09:00), Max: 37 (11 Aug 2022 12:29)  T(F): 97.4 (12 Aug 2022 09:00), Max: 98.6 (11 Aug 2022 12:29)  HR: 80 (12 Aug 2022 09:00) (77 - 110)  BP: 158/91 (12 Aug 2022 09:00) (100/60 - 158/91)  BP(mean): --  RR: 18 (12 Aug 2022 09:00) (16 - 18)  SpO2: 100% (12 Aug 2022 09:00) (96% - 100%)    Parameters below as of 12 Aug 2022 09:00  Patient On (Oxygen Delivery Method): room air        CONSTITUTIONAL: NAD, well-developed, well-groomed  EYES: EOMI; conjunctiva and sclera clear  ENMT: Moist oral mucosa  RESPIRATORY: Normal respiratory effort; lungs are clear to auscultation bilaterally  CARDIOVASCULAR: Regular rate and rhythm, normal S1 and S2, no murmur; No lower extremity edema  ABDOMEN: Nontender to palpation, normoactive bowel sounds, no rebound/guarding  MUSCULOSKELETAL:   no clubbing or cyanosis of digits; no joint swelling or tenderness to palpation  PSYCH: A+O to person, place, and time; affect appropriate  NEUROLOGY: CN 2-12 are intact and symmetric; no gross sensory deficits   SKIN: No rashes; no palpable lesions    LABS:                        13.1   7.42  )-----------( 256      ( 11 Aug 2022 06:35 )             40.0     08-11    139  |  98  |  37<H>  ----------------------------<  94  2.9<LL>   |  27  |  8.20<H>    Ca    9.4      11 Aug 2022 06:35  Phos  2.3     08-11  Mg     2.20     08-11    TPro  6.9  /  Alb  3.5  /  TBili  0.5  /  DBili  x   /  AST  11  /  ALT  5   /  AlkPhos  154<H>  08-11    PT/INR - ( 11 Aug 2022 06:35 )   PT: 12.1 sec;   INR: 1.04 ratio                   RADIOLOGY & ADDITIONAL TESTS:  Results Reviewed:   Imaging Personally Reviewed:  Electrocardiogram Personally Reviewed:    COORDINATION OF CARE:  Care Discussed with Consultants/Other Providers [Y/N]: Y  Prior or Outpatient Records Reviewed [Y/N]: Y   [facility] Division of Hospital Medicine  Rebecca Her MD  Pager (M-F, 8A-5P): [phone]  Other Times:  [id number]    Patient is a 81y old  Female who presents with a chief complaint of Chest pain (11 Aug 2022 16:00)      SUBJECTIVE / OVERNIGHT EVENTS:  pt without complaints.     MEDICATIONS  (STANDING):  amLODIPine   Tablet 10 milliGRAM(s) Oral daily  atorvastatin 20 milliGRAM(s) Oral at bedtime  chlorhexidine 2% Cloths 1 Application(s) Topical daily  heparin   Injectable 5000 Unit(s) SubCutaneous every 8 hours  metoprolol tartrate 50 milliGRAM(s) Oral two times a day    MEDICATIONS  (PRN):  acetaminophen     Tablet .. 650 milliGRAM(s) Oral every 6 hours PRN Temp greater or equal to 38C (100.4F), Mild Pain (1 - 3)  aluminum hydroxide/magnesium hydroxide/simethicone Suspension 30 milliLiter(s) Oral every 4 hours PRN Dyspepsia  melatonin 3 milliGRAM(s) Oral at bedtime PRN Insomnia  ondansetron Injectable 4 milliGRAM(s) IV Push every 8 hours PRN Nausea and/or Vomiting      CAPILLARY BLOOD GLUCOSE        I&O's Summary    11 Aug 2022 07:01  -  12 Aug 2022 07:00  --------------------------------------------------------  IN: 600 mL / OUT: 700 mL / NET: -100 mL        PHYSICAL EXAM:  Vital Signs Last 24 Hrs  T(C): 36.3 (12 Aug 2022 09:00), Max: 37 (11 Aug 2022 12:29)  T(F): 97.4 (12 Aug 2022 09:00), Max: 98.6 (11 Aug 2022 12:29)  HR: 80 (12 Aug 2022 09:00) (77 - 110)  BP: 158/91 (12 Aug 2022 09:00) (100/60 - 158/91)  BP(mean): --  RR: 18 (12 Aug 2022 09:00) (16 - 18)  SpO2: 100% (12 Aug 2022 09:00) (96% - 100%)    Parameters below as of 12 Aug 2022 09:00  Patient On (Oxygen Delivery Method): room air    CONSTITUTIONAL: NAD, well-developed, well-groomed  EYES: EOMI; conjunctiva and sclera clear  ENMT: Moist oral mucosa  RESPIRATORY: Normal respiratory effort; lungs are clear to auscultation bilaterally  CARDIOVASCULAR: Regular rate and rhythm, normal S1 and S2, no murmur; No lower extremity edema  ABDOMEN: Nontender to palpation, normoactive bowel sounds, no rebound/guarding  MUSCULOSKELETAL:   no clubbing or cyanosis of digits; no joint swelling or tenderness to palpation  PSYCH: A+O to person, place, and time; affect appropriate  NEUROLOGY: CN 2-12 are intact and symmetric; no gross sensory deficits   [facility]     LABS:                        13.1   7.42  )-----------( 256      ( 11 Aug 2022 06:35 )             40.0     08-11    139  |  98  |  37<H>  ----------------------------<  94  2.9<LL>   |  27  |  8.20<H>    Ca    9.4      11 Aug 2022 06:35  Phos  2.3     08-11  Mg     2.20     08-11    TPro  6.9  /  Alb  3.5  /  TBili  0.5  /  DBili  x   /  AST  11  /  ALT  5   /  AlkPhos  154<H>  08-11    PT/INR - ( 11 Aug 2022 06:35 )   PT: 12.1 sec;   INR: 1.04 ratio                   RADIOLOGY & ADDITIONAL TESTS:  Results Reviewed:   Imaging Personally Reviewed:  Electrocardiogram Personally Reviewed:    COORDINATION OF CARE:  Care Discussed with Consultants/Other Providers [Y/N]: Y  Prior or Outpatient Records Reviewed [Y/N]: Y

## 2022-08-12 NOTE — PROGRESS NOTE ADULT - PROBLEM SELECTOR PLAN 4
- appreciate vascular- plan for new AVF next week   - Duplex reviewed - will need vein mapping  - continue to use R groin Shiley as HD access - IR permacath placement pending

## 2022-08-12 NOTE — CONSULT NOTE ADULT - ASSESSMENT
Interventional Radiology    Evaluate for Procedure: placement of tunnelled dialysis catheter, removal of Right groin Shiley.    HPI: 81y Female with ESRD on HD, left arm lymphadema, p/w malfunctioning AVF and chest pain s/p TPA injection into AVF.  Patient has dialysis access via right groin Shiley.  IR consulted for placement of tunnelled dialysis catheter, removal of Right groin Shiley.    Allergies:   Medications (Abx/Cardiac/Anticoagulation/Blood Products)  amLODIPine   Tablet: 10 milliGRAM(s) Oral (08-12 @ 05:38)  heparin   Injectable: 5000 Unit(s) SubCutaneous (08-12 @ 14:42)  metoprolol tartrate: 50 milliGRAM(s) Oral (08-12 @ 05:38)    Data:    T(C): 36.4  HR: 84  BP: 110/60  RR: 18  SpO2: 99%    -WBC 7.42 / HgB 13.1 / Hct 40.0 / Plt 256  -Na 139 / Cl 98 / BUN 37 / Glucose 94  -K 2.9 / CO2 27 / Cr 8.20  -ALT 5 / Alk Phos 154 / T.Bili 0.5  -INR 1.04 / PTT --    Assessment/Plan:     81y Female with ESRD on HD, left arm lymphadema, p/w malfunctioning AVF and chest pain s/p TPA injection into AVF.  Patient has dialysis access via right groin Shiley.  IR consulted for placement of tunnelled dialysis catheter, removal of Right groin Shiley.    -- vascular surgery to plan for AVF next week  -- IR will plan to perform placement of tunnelled dialysis catheter Wednesday, 8/17  -- NPO at midnight on 8/17  -- hold a.m. anticoagulation  -- complete IR pre-procedure note  -- maintain COVID PCR within 5 days of planned procedure   -- place IR procedure request order under Dr. Clark today or on Monday

## 2022-08-12 NOTE — PROGRESS NOTE ADULT - SUBJECTIVE AND OBJECTIVE BOX
Vascular Surgery Progress Note    Subjective/24hour Events:   Patient seen and examined.      Vital Signs:  Vital Signs Last 24 Hrs  T(C): 36.4 (12 Aug 2022 12:54), Max: 36.8 (12 Aug 2022 01:00)  T(F): 97.5 (12 Aug 2022 12:54), Max: 98.2 (12 Aug 2022 01:00)  HR: 84 (12 Aug 2022 12:54) (77 - 96)  BP: 110/60 (12 Aug 2022 12:54) (101/61 - 158/91)  BP(mean): --  RR: 18 (12 Aug 2022 12:54) (17 - 18)  SpO2: 99% (12 Aug 2022 12:54) (98% - 100%)    Parameters below as of 12 Aug 2022 12:54  Patient On (Oxygen Delivery Method): room air        CAPILLARY BLOOD GLUCOSE          I&O's Detail    11 Aug 2022 07:01  -  12 Aug 2022 07:00  --------------------------------------------------------  IN:    Other (mL): 600 mL  Total IN: 600 mL    OUT:    Other (mL): 700 mL  Total OUT: 700 mL    Total NET: -100 mL          Physical Exam:  Gen: NAD, resting comfortably in bed  CV: Regular rate  Resp: Nonlabored breathing on room air  Ext: RUE with c/d/i dressing over sutured areas of AVF at former angiocath sites  Vasc: Thrill or pulse not palpated in fistula      Labs:    08-11    139  |  98  |  37<H>  ----------------------------<  94  2.9<LL>   |  27  |  8.20<H>    Ca    9.4      11 Aug 2022 06:35  Phos  2.3     08-11  Mg     2.20     08-11    TPro  6.9  /  Alb  3.5  /  TBili  0.5  /  DBili  x   /  AST  11  /  ALT  5   /  AlkPhos  154<H>  08-11    LIVER FUNCTIONS - ( 11 Aug 2022 06:35 )  Alb: 3.5 g/dL / Pro: 6.9 g/dL / ALK PHOS: 154 U/L / ALT: 5 U/L / AST: 11 U/L / GGT: x                                 13.1   7.42  )-----------( 256      ( 11 Aug 2022 06:35 )             40.0     PT/INR - ( 11 Aug 2022 06:35 )   PT: 12.1 sec;   INR: 1.04 ratio

## 2022-08-13 LAB
ANION GAP SERPL CALC-SCNC: 22 MMOL/L — HIGH (ref 7–14)
BASOPHILS # BLD AUTO: 0.05 K/UL — SIGNIFICANT CHANGE UP (ref 0–0.2)
BASOPHILS NFR BLD AUTO: 0.7 % — SIGNIFICANT CHANGE UP (ref 0–2)
BUN SERPL-MCNC: 74 MG/DL — HIGH (ref 7–23)
CALCIUM SERPL-MCNC: 9.9 MG/DL — SIGNIFICANT CHANGE UP (ref 8.4–10.5)
CHLORIDE SERPL-SCNC: 93 MMOL/L — LOW (ref 98–107)
CO2 SERPL-SCNC: 20 MMOL/L — LOW (ref 22–31)
CREAT SERPL-MCNC: 12.54 MG/DL — HIGH (ref 0.5–1.3)
EGFR: 3 ML/MIN/1.73M2 — LOW
EOSINOPHIL # BLD AUTO: 0.2 K/UL — SIGNIFICANT CHANGE UP (ref 0–0.5)
EOSINOPHIL NFR BLD AUTO: 2.8 % — SIGNIFICANT CHANGE UP (ref 0–6)
GLUCOSE SERPL-MCNC: 92 MG/DL — SIGNIFICANT CHANGE UP (ref 70–99)
HCT VFR BLD CALC: 36 % — SIGNIFICANT CHANGE UP (ref 34.5–45)
HGB BLD-MCNC: 12 G/DL — SIGNIFICANT CHANGE UP (ref 11.5–15.5)
IANC: 4.68 K/UL — SIGNIFICANT CHANGE UP (ref 1.8–7.4)
IMM GRANULOCYTES NFR BLD AUTO: 3.1 % — HIGH (ref 0–1.5)
LYMPHOCYTES # BLD AUTO: 1.41 K/UL — SIGNIFICANT CHANGE UP (ref 1–3.3)
LYMPHOCYTES # BLD AUTO: 19.6 % — SIGNIFICANT CHANGE UP (ref 13–44)
MAGNESIUM SERPL-MCNC: 2.5 MG/DL — SIGNIFICANT CHANGE UP (ref 1.6–2.6)
MCHC RBC-ENTMCNC: 30.9 PG — SIGNIFICANT CHANGE UP (ref 27–34)
MCHC RBC-ENTMCNC: 33.3 GM/DL — SIGNIFICANT CHANGE UP (ref 32–36)
MCV RBC AUTO: 92.8 FL — SIGNIFICANT CHANGE UP (ref 80–100)
MONOCYTES # BLD AUTO: 0.64 K/UL — SIGNIFICANT CHANGE UP (ref 0–0.9)
MONOCYTES NFR BLD AUTO: 8.9 % — SIGNIFICANT CHANGE UP (ref 2–14)
NEUTROPHILS # BLD AUTO: 4.68 K/UL — SIGNIFICANT CHANGE UP (ref 1.8–7.4)
NEUTROPHILS NFR BLD AUTO: 64.9 % — SIGNIFICANT CHANGE UP (ref 43–77)
NRBC # BLD: 0 /100 WBCS — SIGNIFICANT CHANGE UP
NRBC # FLD: 0 K/UL — SIGNIFICANT CHANGE UP
PHOSPHATE SERPL-MCNC: 4.2 MG/DL — SIGNIFICANT CHANGE UP (ref 2.5–4.5)
PLATELET # BLD AUTO: 307 K/UL — SIGNIFICANT CHANGE UP (ref 150–400)
POTASSIUM SERPL-MCNC: 4.1 MMOL/L — SIGNIFICANT CHANGE UP (ref 3.5–5.3)
POTASSIUM SERPL-SCNC: 4.1 MMOL/L — SIGNIFICANT CHANGE UP (ref 3.5–5.3)
RBC # BLD: 3.88 M/UL — SIGNIFICANT CHANGE UP (ref 3.8–5.2)
RBC # FLD: 15.9 % — HIGH (ref 10.3–14.5)
SODIUM SERPL-SCNC: 135 MMOL/L — SIGNIFICANT CHANGE UP (ref 135–145)
WBC # BLD: 7.2 K/UL — SIGNIFICANT CHANGE UP (ref 3.8–10.5)
WBC # FLD AUTO: 7.2 K/UL — SIGNIFICANT CHANGE UP (ref 3.8–10.5)

## 2022-08-13 PROCEDURE — 90935 HEMODIALYSIS ONE EVALUATION: CPT

## 2022-08-13 PROCEDURE — 99232 SBSQ HOSP IP/OBS MODERATE 35: CPT

## 2022-08-13 RX ORDER — ALTEPLASE 100 MG
2 KIT INTRAVENOUS ONCE
Refills: 0 | Status: COMPLETED | OUTPATIENT
Start: 2022-08-13 | End: 2022-08-13

## 2022-08-13 RX ORDER — ALTEPLASE 100 MG
2 KIT INTRAVENOUS ONCE
Refills: 0 | Status: DISCONTINUED | OUTPATIENT
Start: 2022-08-13 | End: 2022-08-14

## 2022-08-13 RX ADMIN — ALTEPLASE 2 MILLIGRAM(S): KIT at 07:00

## 2022-08-13 RX ADMIN — HEPARIN SODIUM 5000 UNIT(S): 5000 INJECTION INTRAVENOUS; SUBCUTANEOUS at 15:22

## 2022-08-13 RX ADMIN — HEPARIN SODIUM 5000 UNIT(S): 5000 INJECTION INTRAVENOUS; SUBCUTANEOUS at 05:15

## 2022-08-13 RX ADMIN — Medication 50 MILLIGRAM(S): at 17:37

## 2022-08-13 RX ADMIN — CHLORHEXIDINE GLUCONATE 1 APPLICATION(S): 213 SOLUTION TOPICAL at 15:22

## 2022-08-13 RX ADMIN — HEPARIN SODIUM 5000 UNIT(S): 5000 INJECTION INTRAVENOUS; SUBCUTANEOUS at 23:30

## 2022-08-13 NOTE — PROGRESS NOTE ADULT - SUBJECTIVE AND OBJECTIVE BOX
Patient is a 81y old  Female who presents with a chief complaint of Chest pain (13 Aug 2022 08:51)    SUBJECTIVE / OVERNIGHT EVENTS: No acute events. Feels comfortable, no pain or discomfort, no nausea or vomiting, no abdominal pain. No pain at right groin catheter site.       MEDICATIONS  (STANDING):  alteplase for catheter clearance 2 milliGRAM(s) Catheter once  alteplase for catheter clearance 2 milliGRAM(s) Catheter once  amLODIPine   Tablet 10 milliGRAM(s) Oral daily  atorvastatin 20 milliGRAM(s) Oral at bedtime  chlorhexidine 2% Cloths 1 Application(s) Topical daily  heparin   Injectable 5000 Unit(s) SubCutaneous every 8 hours  metoprolol tartrate 50 milliGRAM(s) Oral two times a day    MEDICATIONS  (PRN):  acetaminophen     Tablet .. 650 milliGRAM(s) Oral every 6 hours PRN Temp greater or equal to 38C (100.4F), Mild Pain (1 - 3)  aluminum hydroxide/magnesium hydroxide/simethicone Suspension 30 milliLiter(s) Oral every 4 hours PRN Dyspepsia  melatonin 3 milliGRAM(s) Oral at bedtime PRN Insomnia  ondansetron Injectable 4 milliGRAM(s) IV Push every 8 hours PRN Nausea and/or Vomiting    CAPILLARY BLOOD GLUCOSE    I&O's Summary    PHYSICAL EXAM:  Vital Signs Last 24 Hrs  T(C): 36.9 (13 Aug 2022 13:07), Max: 37 (13 Aug 2022 01:24)  T(F): 98.4 (13 Aug 2022 13:07), Max: 98.6 (13 Aug 2022 01:24)  HR: 77 (13 Aug 2022 13:07) (77 - 96)  BP: 104/62 (13 Aug 2022 13:07) (104/62 - 125/82)  BP(mean): --  RR: 18 (13 Aug 2022 13:07) (18 - 18)  SpO2: 100% (13 Aug 2022 13:07) (98% - 100%)    Parameters below as of 13 Aug 2022 13:07  Patient On (Oxygen Delivery Method): room air    CONSTITUTIONAL: NAD, well-developed, well-groomed  EYES: EOMI; conjunctiva and sclera clear  ENMT: Moist oral mucosa  RESPIRATORY: Normal respiratory effort; lungs are clear to auscultation bilaterally  CARDIOVASCULAR: Regular rate and rhythm, normal S1 and S2; No lower extremity edema; Peripheral pulses are 2+ bilaterally  ABDOMEN: Nontender to palpation, normoactive bowel sounds, no rebound/guarding  PSYCH: calm; affect appropriate  NEUROLOGY: moving all extremities, nonfocal  SKIN: No rashes    LABS:                        12.0   7.20  )-----------( 307      ( 13 Aug 2022 06:33 )             36.0     08-13    135  |  93<L>  |  74<H>  ----------------------------<  92  4.1   |  20<L>  |  12.54<H>    Ca    9.9      13 Aug 2022 06:33  Phos  4.2     08-13  Mg     2.50     08-13    RADIOLOGY & ADDITIONAL TESTS: Reviewed    COORDINATION OF CARE:  Care Discussed with Consultants/Other Providers [Y- Medicine ACP]

## 2022-08-13 NOTE — PROGRESS NOTE ADULT - SUBJECTIVE AND OBJECTIVE BOX
[facility] Division of Kidney Diseases & Hypertension  FOLLOW UP NOTE  --------------------------------------------------------------------------------    HPI: Patient is an 81 years old woman ESRD on HD TT who was admitted while experiencing chest pain following TPA for an outpatient fistula angioplasty.  She has been receiving hemodialysis through non-tunneled HD catheter since admission.  Last dialysis session was thursday and blood flows were poor and patient was hypotensive.  Patient was seen and evaluated at dialysis this morning reporting no chest pain no shortness of breath however dialysis could not be performed due to high access pressures.  Cathflo attempted but still did not work.        PAST HISTORY  --------------------------------------------------------------------------------  No significant changes to PMH, PSH, FHx, SHx, unless otherwise noted    ALLERGIES & MEDICATIONS  --------------------------------------------------------------------------------  Allergies    No Known Allergies    Intolerances      Standing Inpatient Medications  amLODIPine   Tablet 10 milliGRAM(s) Oral daily  atorvastatin 20 milliGRAM(s) Oral at bedtime  chlorhexidine 2% Cloths 1 Application(s) Topical daily  heparin   Injectable 5000 Unit(s) SubCutaneous every 8 hours  metoprolol tartrate 50 milliGRAM(s) Oral two times a day    PRN Inpatient Medications  acetaminophen     Tablet .. 650 milliGRAM(s) Oral every 6 hours PRN  aluminum hydroxide/magnesium hydroxide/simethicone Suspension 30 milliLiter(s) Oral every 4 hours PRN  melatonin 3 milliGRAM(s) Oral at bedtime PRN  ondansetron Injectable 4 milliGRAM(s) IV Push every 8 hours PRN      REVIEW OF SYSTEMS  --------------------------------------------------------------------------------  Gen: no fever  Respiratory: no sob  CV: no cp  GI: no ab pain  : no complaints  MSK: no pain    VITALS/PHYSICAL EXAM  --------------------------------------------------------------------------------  T(C): 36.9 (08-13-22 @ 05:11), Max: 37 (08-13-22 @ 01:24)  HR: 86 (08-13-22 @ 05:11) (80 - 96)  BP: 123/76 (08-13-22 @ 05:11) (110/60 - 158/91)  ABP: --  ABP(mean): --  RR: 18 (08-13-22 @ 05:11) (18 - 18)  SpO2: 99% (08-13-22 @ 05:11) (98% - 100%)  CVP(mm Hg): --        Physical Exam:  	Gen: distress  	HEENT: anicteric  	Pulm: CTA B/L  	CV: RRR, S1S2; no rub  	Abd: non tender  	LE: no edema  	Neuro: awake alert  	Psych: Normal affect and mood  	Skin: dry  	Vascular access: RUE AVF dressing in place w/o bruit thrill.  R non tunneled HD catheter site CDI    LABS/STUDIES  --------------------------------------------------------------------------------              12.0   7.20  >-----------<  307      [08-13-22 @ 06:33]              36.0     135  |  93  |  74  ----------------------------<  92      [08-13-22 @ 06:33]  4.1   |  20  |  12.54        Ca     9.9     [08-13-22 @ 06:33]      Mg     2.50     [08-13-22 @ 06:33]      Phos  4.2     [08-13-22 @ 06:33]            Creatinine Trend:  SCr 12.54 [08-13 @ 06:33]  SCr 8.20 [08-11 @ 06:35]  SCr 13.77 [08-09 @ 06:13]  SCr 13.13 [08-08 @ 17:30]          HBsAb 15.6      [08-09-22 @ 06:13]  HBsAg Nonreact      [08-09-22 @ 06:13]  HBcAb Nonreact      [08-09-22 @ 06:13]  HCV 0.22, Nonreact      [08-09-22 @ 06:13]

## 2022-08-13 NOTE — PROGRESS NOTE ADULT - PROBLEM SELECTOR PLAN 4
- appreciate vascular- plan for new AVF next week   - Duplex reviewed - will need vein mapping  - IR to exchange HD catheter today

## 2022-08-13 NOTE — PROGRESS NOTE ADULT - SUBJECTIVE AND OBJECTIVE BOX
C Team (General Surgery) Daily Progress Note    SUBJECTIVE:  Patient seen and examined.    24 Hours:  - HD attempted 8/13 AM and shiley not functional  - Alteplase attempted to restore functional status to shiley  - Plan for HD once shiley functional    OBJECTIVE:  Vital Signs Last 24 Hrs  T(C): 36.9 (13 Aug 2022 13:07), Max: 37 (13 Aug 2022 01:24)  T(F): 98.4 (13 Aug 2022 13:07), Max: 98.6 (13 Aug 2022 01:24)  HR: 77 (13 Aug 2022 13:07) (77 - 96)  BP: 104/62 (13 Aug 2022 13:07) (104/62 - 125/82)  BP(mean): --  RR: 18 (13 Aug 2022 13:07) (18 - 18)  SpO2: 100% (13 Aug 2022 13:07) (98% - 100%)    Parameters below as of 13 Aug 2022 13:07  Patient On (Oxygen Delivery Method): room air        I&O's Detail    Exam:  Gen: NAD, resting comfortably in bed  CV: Regular rate  Resp: Nonlabored breathing on room air  Ext: RUE with c/d/i dressing over sutured areas of AVF at former angiocath sites  Vasc: Thrill or pulse not palpated in fistula                        12.0   7.20  )-----------( 307      ( 13 Aug 2022 06:33 )             36.0       08-13    135  |  93<L>  |  74<H>  ----------------------------<  92  4.1   |  20<L>  |  12.54<H>    Ca    9.9      13 Aug 2022 06:33  Phos  4.2     08-13  Mg     2.50     08-13            ASSESSMENT:    81y F PMH HTN, Left arm lymphedema, ESRD on HD via RUE AVF (Tu/Th/Sa), presenting with malfunctioning AVF and chest pain s/p TPA injection into AVF.     RUE AVF duplex appreciated.    Vein mapping appreciated.    Plan:  - Manipulate shiley to restore function for HD 8/13  - Ateplase for shiley manipulation prior to HD 8/13 PM  - HD once shiley functional  - Plan for new AVF creation next week (likely Thursday)  - Protect LUE  - Would appreciate medical and cardiac optimization  - HD via [facility]  - Remainder of care per primary team      Vascular (C Team) Surgery  [id number]

## 2022-08-14 PROCEDURE — 99232 SBSQ HOSP IP/OBS MODERATE 35: CPT

## 2022-08-14 RX ADMIN — HEPARIN SODIUM 5000 UNIT(S): 5000 INJECTION INTRAVENOUS; SUBCUTANEOUS at 22:55

## 2022-08-14 RX ADMIN — HEPARIN SODIUM 5000 UNIT(S): 5000 INJECTION INTRAVENOUS; SUBCUTANEOUS at 14:52

## 2022-08-14 RX ADMIN — ATORVASTATIN CALCIUM 20 MILLIGRAM(S): 80 TABLET, FILM COATED ORAL at 06:56

## 2022-08-14 RX ADMIN — Medication 50 MILLIGRAM(S): at 06:58

## 2022-08-14 RX ADMIN — HEPARIN SODIUM 5000 UNIT(S): 5000 INJECTION INTRAVENOUS; SUBCUTANEOUS at 06:40

## 2022-08-14 RX ADMIN — ATORVASTATIN CALCIUM 20 MILLIGRAM(S): 80 TABLET, FILM COATED ORAL at 22:55

## 2022-08-14 RX ADMIN — AMLODIPINE BESYLATE 10 MILLIGRAM(S): 2.5 TABLET ORAL at 06:40

## 2022-08-14 RX ADMIN — CHLORHEXIDINE GLUCONATE 1 APPLICATION(S): 213 SOLUTION TOPICAL at 13:22

## 2022-08-14 RX ADMIN — Medication 50 MILLIGRAM(S): at 19:12

## 2022-08-14 NOTE — PROGRESS NOTE ADULT - SUBJECTIVE AND OBJECTIVE BOX
Patient is a 81y old  Female who presents with a chief complaint of Chest pain (13 Aug 2022 16:40)    SUBJECTIVE / OVERNIGHT EVENTS: No acute events. Reports her shiley was fixed and she had HD last night. No pain or complaints. No fever, nausea, vomiting, chest pain, shortness of breath, bleeding.     MEDICATIONS  (STANDING):  amLODIPine   Tablet 10 milliGRAM(s) Oral daily  atorvastatin 20 milliGRAM(s) Oral at bedtime  chlorhexidine 2% Cloths 1 Application(s) Topical daily  heparin   Injectable 5000 Unit(s) SubCutaneous every 8 hours  metoprolol tartrate 50 milliGRAM(s) Oral two times a day    MEDICATIONS  (PRN):  acetaminophen     Tablet .. 650 milliGRAM(s) Oral every 6 hours PRN Temp greater or equal to 38C (100.4F), Mild Pain (1 - 3)  aluminum hydroxide/magnesium hydroxide/simethicone Suspension 30 milliLiter(s) Oral every 4 hours PRN Dyspepsia  melatonin 3 milliGRAM(s) Oral at bedtime PRN Insomnia  ondansetron Injectable 4 milliGRAM(s) IV Push every 8 hours PRN Nausea and/or Vomiting    CAPILLARY BLOOD GLUCOSE    I&O's Summary    13 Aug 2022 07:01  -  14 Aug 2022 07:00  --------------------------------------------------------  IN: 400 mL / OUT: 1900 mL / NET: -1500 mL    PHYSICAL EXAM:  Vital Signs Last 24 Hrs  T(C): 37.1 (14 Aug 2022 06:37), Max: 37.1 (14 Aug 2022 03:07)  T(F): 98.7 (14 Aug 2022 06:37), Max: 98.8 (14 Aug 2022 03:07)  HR: 69 (14 Aug 2022 06:37) (60 - 86)  BP: 114/62 (14 Aug 2022 06:37) (104/62 - 140/63)  BP(mean): --  RR: 18 (14 Aug 2022 06:37) (18 - 19)  SpO2: 98% (14 Aug 2022 06:37) (98% - 100%)    Parameters below as of 14 Aug 2022 06:37  Patient On (Oxygen Delivery Method): room air    CONSTITUTIONAL: NAD, well-developed, well-groomed  EYES: EOMI; conjunctiva and sclera clear  ENMT: Moist oral mucosa, normal dentition  NECK: Supple  RESPIRATORY: Normal respiratory effort; lungs are clear to auscultation bilaterally  CARDIOVASCULAR: Regular rate and rhythm, normal S1 and S2, No lower extremity edema; Peripheral pulses are 2+ bilaterally  ABDOMEN: Nontender to palpation, normoactive bowel sounds, no rebound/guarding  PSYCH: calm, affect appropriate  NEUROLOGY: moving all extremities, no focal deficits, no gross sensory deficits   SKIN: No rashes  +R groin [employer] [facility]    LABS:                        12.0   7.20  )-----------( 307      ( 13 Aug 2022 06:33 )             36.0     08-13    135  |  93<L>  |  74<H>  ----------------------------<  92  4.1   |  20<L>  |  12.54<H>    Ca    9.9      13 Aug 2022 06:33  Phos  4.2     08-13  Mg     2.50     08-13    RADIOLOGY & ADDITIONAL TESTS: Reviewed    COORDINATION OF CARE:  Care Discussed with Consultants/Other Providers [Y- Medicine ACP]

## 2022-08-15 LAB — SARS-COV-2 RNA SPEC QL NAA+PROBE: SIGNIFICANT CHANGE UP

## 2022-08-15 PROCEDURE — 99233 SBSQ HOSP IP/OBS HIGH 50: CPT

## 2022-08-15 PROCEDURE — 99232 SBSQ HOSP IP/OBS MODERATE 35: CPT

## 2022-08-15 RX ADMIN — HEPARIN SODIUM 5000 UNIT(S): 5000 INJECTION INTRAVENOUS; SUBCUTANEOUS at 15:02

## 2022-08-15 RX ADMIN — HEPARIN SODIUM 5000 UNIT(S): 5000 INJECTION INTRAVENOUS; SUBCUTANEOUS at 21:48

## 2022-08-15 RX ADMIN — CHLORHEXIDINE GLUCONATE 1 APPLICATION(S): 213 SOLUTION TOPICAL at 12:26

## 2022-08-15 RX ADMIN — Medication 50 MILLIGRAM(S): at 05:45

## 2022-08-15 RX ADMIN — Medication 50 MILLIGRAM(S): at 17:49

## 2022-08-15 RX ADMIN — HEPARIN SODIUM 5000 UNIT(S): 5000 INJECTION INTRAVENOUS; SUBCUTANEOUS at 05:45

## 2022-08-15 RX ADMIN — ATORVASTATIN CALCIUM 20 MILLIGRAM(S): 80 TABLET, FILM COATED ORAL at 21:48

## 2022-08-15 RX ADMIN — AMLODIPINE BESYLATE 10 MILLIGRAM(S): 2.5 TABLET ORAL at 05:45

## 2022-08-15 NOTE — PROGRESS NOTE ADULT - SUBJECTIVE AND OBJECTIVE BOX
[facility] DIVISION OF KIDNEY DISEASES AND HYPERTENSION -- FOLLOW UP NOTE  --------------------------------------------------------------------------------  Chief Complaint: ESRD  on HD    24 hour events/subjective: Patient had 3 hrs of HD on Sat. Currently without complaints this AM. No shortness of breath or swelling.         PAST HISTORY  --------------------------------------------------------------------------------  No significant changes to PMH, PSH, FHx, SHx, unless otherwise noted    ALLERGIES & MEDICATIONS  --------------------------------------------------------------------------------  Allergies    No Known Allergies    Intolerances      Standing Inpatient Medications  amLODIPine   Tablet 10 milliGRAM(s) Oral daily  atorvastatin 20 milliGRAM(s) Oral at bedtime  chlorhexidine 2% Cloths 1 Application(s) Topical daily  heparin   Injectable 5000 Unit(s) SubCutaneous every 8 hours  metoprolol tartrate 50 milliGRAM(s) Oral two times a day    PRN Inpatient Medications  acetaminophen     Tablet .. 650 milliGRAM(s) Oral every 6 hours PRN  aluminum hydroxide/magnesium hydroxide/simethicone Suspension 30 milliLiter(s) Oral every 4 hours PRN  melatonin 3 milliGRAM(s) Oral at bedtime PRN  ondansetron Injectable 4 milliGRAM(s) IV Push every 8 hours PRN      REVIEW OF SYSTEMS  --------------------------------------------------------------------------------  Gen: no fever  Respiratory: no sob  CV: no cp  GI: no ab pain  : no complaints  MSK: no pain    All other systems were reviewed and are negative, except as noted.    VITALS/PHYSICAL EXAM  --------------------------------------------------------------------------------  T(C): 36.7 (08-15-22 @ 05:40), Max: 37.1 (08-14-22 @ 19:07)  HR: 66 (08-15-22 @ 05:40) (65 - 86)  BP: 115/58 (08-15-22 @ 05:40) (103/58 - 120/55)  RR: 17 (08-15-22 @ 05:40) (17 - 18)  SpO2: 97% (08-15-22 @ 05:40) (97% - 100%)  Wt(kg): --        Physical Exam:  	Gen: distress  	HEENT: anicteric  	Pulm: CTA B/L  	CV: RRR, S1S2; no rub  	Abd: non tender  	LE: no edema  	Neuro: awake alert  	Psych: Normal affect and mood  	Skin: dry  	Vascular access: RUE AVF dressing in place w/o bruit thrill.  R femoral HD catheter site CDI    LABS/STUDIES  --------------------------------------------------------------------------------                Creatinine Trend:  SCr 12.54 [08-13 @ 06:33]  SCr 8.20 [08-11 @ 06:35]  SCr 13.77 [08-09 @ 06:13]  SCr 13.13 [08-08 @ 17:30]          HBsAb 15.6      [08-09-22 @ 06:13]  HBsAg Nonreact      [08-09-22 @ 06:13]  HBcAb Nonreact      [08-09-22 @ 06:13]  HCV 0.22, Nonreact      [08-09-22 @ 06:13]

## 2022-08-15 NOTE — PROGRESS NOTE ADULT - ASSESSMENT
81 Yr old female HTN, Left arm lymphedema, reported hx of Afib not on AC, ESRD On Tu/Th/Sat, Right AVF last HD session on Thursday now p/w R chest pain reproducible on palpation after TPA of her fistula. Admitted for new vascular access for dialysis and chest pain work up.    RCRI on 8/15: Class II Risk, 6.0% 30 day risk of cardiac arrest, MI or death. Cardiology consulted for additional dejan-op risk assessment on 8/15.

## 2022-08-15 NOTE — DIETITIAN INITIAL EVALUATION ADULT - NS FNS WEIGHT CHANGE REASON
Patient reported # 3 months ago,  most recent weight 8/.7# Wt trends reflect weight loss of 8.3lbs/5.03%  in 3 months. Pt noted with 1+ generalized and 2+ left arm edema which may mask weight loss./unintentional

## 2022-08-15 NOTE — DIETITIAN INITIAL EVALUATION ADULT - OTHER INFO
Per chart review, 81 Yr old female HTN, Left arm lymphedema, reported hx of Afib not on AC, ESRD On Tu/Th/Sat, Right AVF last HD session on Thursday now p/w R chest pain reproducible on palpation after TPA of her fistula. Admitted for new vascular access for dialysis and chest pain work up.     Patient seen at bedside. Reports fair appetite. Appetite hasn't been change PTA and in house. Patient able to consume 51-75% of her food. Food preferences explored, patient is amenable to provision of Nepro 1x daily. Denies chewing and swallowing difficulties. Denies any GI issues (nausea/vomiting/diarrhea/constipation.) ordered ondansetron for nausea and vomiting. Patient skin noted with incision to right shiley/HD cath. Patient reported # 3 months ago,  most recent weight 8/13 156.7# Wt trends reflect weight loss of 8.3lbs/5.03%  in 3 months. Pt noted with 1+ generalized and 2+ left arm edema which may mask weight loss.

## 2022-08-15 NOTE — PHYSICAL THERAPY INITIAL EVALUATION ADULT - PERTINENT HX OF CURRENT PROBLEM, REHAB EVAL
Patient is 81 Year old female HTN, Left arm lymphedema, reported hx of Afib not on AC, ESRD On Tu/Th/Sat, Right AVF last HD session on Thursday now present with right chest pain reproducible on palpation after TPA of her fistula. Admitted for new vascular access for dialysis and chest pain work up.

## 2022-08-15 NOTE — DIETITIAN INITIAL EVALUATION ADULT - NS FNS DIET ORDER
Diet, Regular:   For patients receiving Renal Replacement - No Protein Restr, No Conc K, No Conc Phos, Low Sodium (RENAL) (08-12-22 @ 12:54)

## 2022-08-15 NOTE — PROGRESS NOTE ADULT - ASSESSMENT
81y F PMH HTN, Left arm lymphedema, ESRD on HD via RUE AVF (Tu/Th/Sa), presenting with malfunctioning AVF and chest pain s/p TPA injection into AVF.     RUE AVF duplex appreciated.    Vein mapping appreciated.    Plan:  - Plan for new AVF creation this week (likely Thursday)  - Protect SAM  - Would appreciate medical and cardiac optimization  - HD via [facility] brad[facility] S[facility]  - IR consult for permacath placement  - Remainder of care per primary team      Vascular (C Team) Surgery  [id number]

## 2022-08-15 NOTE — DIETITIAN INITIAL EVALUATION ADULT - PERTINENT MEDS FT
MEDICATIONS  (STANDING):  amLODIPine   Tablet 10 milliGRAM(s) Oral daily  atorvastatin 20 milliGRAM(s) Oral at bedtime  chlorhexidine 2% Cloths 1 Application(s) Topical daily  heparin   Injectable 5000 Unit(s) SubCutaneous every 8 hours  metoprolol tartrate 50 milliGRAM(s) Oral two times a day    MEDICATIONS  (PRN):  acetaminophen     Tablet .. 650 milliGRAM(s) Oral every 6 hours PRN Temp greater or equal to 38C (100.4F), Mild Pain (1 - 3)  aluminum hydroxide/magnesium hydroxide/simethicone Suspension 30 milliLiter(s) Oral every 4 hours PRN Dyspepsia  melatonin 3 milliGRAM(s) Oral at bedtime PRN Insomnia  ondansetron Injectable 4 milliGRAM(s) IV Push every 8 hours PRN Nausea and/or Vomiting

## 2022-08-15 NOTE — PROGRESS NOTE ADULT - SUBJECTIVE AND OBJECTIVE BOX
[facility] Division of Hospital Medicine  Radha Zhu MD  Pager (M-F, 8A-5P): [phone]  Other Times:  [phone]      SUBJECTIVE / OVERNIGHT EVENTS: Pt feeling well this am, mostly just wanting to go home. Says the groin shiley is slightly irritating, rates pain in the area at 5/10. Afebrile overnight.    MEDICATIONS  (STANDING):  amLODIPine   Tablet 10 milliGRAM(s) Oral daily  atorvastatin 20 milliGRAM(s) Oral at bedtime  chlorhexidine 2% Cloths 1 Application(s) Topical daily  heparin   Injectable 5000 Unit(s) SubCutaneous every 8 hours  metoprolol tartrate 50 milliGRAM(s) Oral two times a day    MEDICATIONS  (PRN):  acetaminophen     Tablet .. 650 milliGRAM(s) Oral every 6 hours PRN Temp greater or equal to 38C (100.4F), Mild Pain (1 - 3)  aluminum hydroxide/magnesium hydroxide/simethicone Suspension 30 milliLiter(s) Oral every 4 hours PRN Dyspepsia  melatonin 3 milliGRAM(s) Oral at bedtime PRN Insomnia  ondansetron Injectable 4 milliGRAM(s) IV Push every 8 hours PRN Nausea and/or Vomiting      I&O's Summary      PHYSICAL EXAM:  Vital Signs Last 24 Hrs  T(C): 36.7 (15 Aug 2022 05:40), Max: 37.1 (14 Aug 2022 19:07)  T(F): 98 (15 Aug 2022 05:40), Max: 98.7 (14 Aug 2022 19:07)  HR: 66 (15 Aug 2022 05:40) (65 - 86)  BP: 115/58 (15 Aug 2022 05:40) (115/58 - 120/55)  BP(mean): --  RR: 17 (15 Aug 2022 05:40) (17 - 18)  SpO2: 97% (15 Aug 2022 05:40) (97% - 100%)    Parameters below as of 15 Aug 2022 05:40  Patient On (Oxygen Delivery Method): room air      CONSTITUTIONAL: NAD, well-developed, well-groomed  EYES: EOMI; conjunctiva and sclera clear  ENMT: Moist oral mucosa, normal dentition  NECK: Supple  RESPIRATORY: Normal respiratory effort; lungs are clear to auscultation bilaterally  CARDIOVASCULAR: Regular rate and rhythm, normal S1 and S2, No lower extremity edema; Peripheral pulses are 2+ bilaterally  ABDOMEN: Nontender to palpation, normoactive bowel sounds, no rebound/guarding  PSYCH: calm, affect appropriate  NEUROLOGY: moving all extremities, no focal deficits, no gross sensory deficits   SKIN: No rashes  +R groin shiley    LABS:                      RADIOLOGY & ADDITIONAL TESTS:  Results Reviewed:   Imaging Personally Reviewed:  Electrocardiogram Personally Reviewed:    COORDINATION OF CARE:  Care Discussed with Consultants/Other Providers [Y/N]: Joelle, Saurav  Prior or Outpatient Records Reviewed [Y/N]:

## 2022-08-15 NOTE — PROGRESS NOTE ADULT - SUBJECTIVE AND OBJECTIVE BOX
Vascular Surgery Progress Note    Subjective/24hour Events:   Patient seen and examined.      Vital Signs:  Vital Signs Last 24 Hrs  T(C): 36.7 (15 Aug 2022 05:40), Max: 37.1 (14 Aug 2022 19:07)  T(F): 98 (15 Aug 2022 05:40), Max: 98.7 (14 Aug 2022 19:07)  HR: 66 (15 Aug 2022 05:40) (65 - 86)  BP: 115/58 (15 Aug 2022 05:40) (103/58 - 120/55)  BP(mean): --  RR: 17 (15 Aug 2022 05:40) (17 - 18)  SpO2: 97% (15 Aug 2022 05:40) (97% - 100%)    Parameters below as of 15 Aug 2022 05:40  Patient On (Oxygen Delivery Method): room air        CAPILLARY BLOOD GLUCOSE          I&O's Detail      Physical Exam:  en: NAD, resting comfortably in bed  CV: Regular rate  Resp: Nonlabored breathing on room air  Ext: RUE with thrill or pulse not palpated in fistula        Labs:

## 2022-08-15 NOTE — DIETITIAN INITIAL EVALUATION ADULT - ADD RECOMMEND
1) Recommend continue with current diet, which remains appropriate at this time.   2) Recommend add Nepro 1x daily (425 kcals, 19.1g protein).   3) Monitor BM, PO intake, Labs, weights, and skin integrity.   4) RDN remains available PRN.

## 2022-08-15 NOTE — DIETITIAN INITIAL EVALUATION ADULT - SIGNS/SYMPTOMS
<75% of EER for >1 month, mild edema.  <75% of EER for >1 month, mild edema, mild fat loss and muscle wasting.

## 2022-08-16 LAB
ANION GAP SERPL CALC-SCNC: 18 MMOL/L — HIGH (ref 7–14)
BASOPHILS # BLD AUTO: 0.05 K/UL — SIGNIFICANT CHANGE UP (ref 0–0.2)
BASOPHILS NFR BLD AUTO: 0.8 % — SIGNIFICANT CHANGE UP (ref 0–2)
BUN SERPL-MCNC: 66 MG/DL — HIGH (ref 7–23)
CALCIUM SERPL-MCNC: 9.4 MG/DL — SIGNIFICANT CHANGE UP (ref 8.4–10.5)
CHLORIDE SERPL-SCNC: 91 MMOL/L — LOW (ref 98–107)
CO2 SERPL-SCNC: 23 MMOL/L — SIGNIFICANT CHANGE UP (ref 22–31)
CREAT SERPL-MCNC: 11.22 MG/DL — HIGH (ref 0.5–1.3)
EGFR: 3 ML/MIN/1.73M2 — LOW
EOSINOPHIL # BLD AUTO: 0.19 K/UL — SIGNIFICANT CHANGE UP (ref 0–0.5)
EOSINOPHIL NFR BLD AUTO: 3.2 % — SIGNIFICANT CHANGE UP (ref 0–6)
GLUCOSE SERPL-MCNC: 86 MG/DL — SIGNIFICANT CHANGE UP (ref 70–99)
HCT VFR BLD CALC: 34.8 % — SIGNIFICANT CHANGE UP (ref 34.5–45)
HGB BLD-MCNC: 11.5 G/DL — SIGNIFICANT CHANGE UP (ref 11.5–15.5)
IANC: 3.43 K/UL — SIGNIFICANT CHANGE UP (ref 1.8–7.4)
IMM GRANULOCYTES NFR BLD AUTO: 2.3 % — HIGH (ref 0–1.5)
LYMPHOCYTES # BLD AUTO: 1.43 K/UL — SIGNIFICANT CHANGE UP (ref 1–3.3)
LYMPHOCYTES # BLD AUTO: 24 % — SIGNIFICANT CHANGE UP (ref 13–44)
MAGNESIUM SERPL-MCNC: 2.2 MG/DL — SIGNIFICANT CHANGE UP (ref 1.6–2.6)
MCHC RBC-ENTMCNC: 30.5 PG — SIGNIFICANT CHANGE UP (ref 27–34)
MCHC RBC-ENTMCNC: 33 GM/DL — SIGNIFICANT CHANGE UP (ref 32–36)
MCV RBC AUTO: 92.3 FL — SIGNIFICANT CHANGE UP (ref 80–100)
MONOCYTES # BLD AUTO: 0.73 K/UL — SIGNIFICANT CHANGE UP (ref 0–0.9)
MONOCYTES NFR BLD AUTO: 12.2 % — SIGNIFICANT CHANGE UP (ref 2–14)
NEUTROPHILS # BLD AUTO: 3.43 K/UL — SIGNIFICANT CHANGE UP (ref 1.8–7.4)
NEUTROPHILS NFR BLD AUTO: 57.5 % — SIGNIFICANT CHANGE UP (ref 43–77)
NRBC # BLD: 0 /100 WBCS — SIGNIFICANT CHANGE UP
NRBC # FLD: 0 K/UL — SIGNIFICANT CHANGE UP
PHOSPHATE SERPL-MCNC: 4.7 MG/DL — HIGH (ref 2.5–4.5)
PLATELET # BLD AUTO: 293 K/UL — SIGNIFICANT CHANGE UP (ref 150–400)
POTASSIUM SERPL-MCNC: 4.1 MMOL/L — SIGNIFICANT CHANGE UP (ref 3.5–5.3)
POTASSIUM SERPL-SCNC: 4.1 MMOL/L — SIGNIFICANT CHANGE UP (ref 3.5–5.3)
RBC # BLD: 3.77 M/UL — LOW (ref 3.8–5.2)
RBC # FLD: 15.6 % — HIGH (ref 10.3–14.5)
SODIUM SERPL-SCNC: 132 MMOL/L — LOW (ref 135–145)
WBC # BLD: 5.97 K/UL — SIGNIFICANT CHANGE UP (ref 3.8–10.5)
WBC # FLD AUTO: 5.97 K/UL — SIGNIFICANT CHANGE UP (ref 3.8–10.5)

## 2022-08-16 PROCEDURE — 99232 SBSQ HOSP IP/OBS MODERATE 35: CPT

## 2022-08-16 PROCEDURE — 99233 SBSQ HOSP IP/OBS HIGH 50: CPT

## 2022-08-16 PROCEDURE — 99252 IP/OBS CONSLTJ NEW/EST SF 35: CPT | Mod: GC

## 2022-08-16 RX ADMIN — CHLORHEXIDINE GLUCONATE 1 APPLICATION(S): 213 SOLUTION TOPICAL at 11:50

## 2022-08-16 RX ADMIN — HEPARIN SODIUM 5000 UNIT(S): 5000 INJECTION INTRAVENOUS; SUBCUTANEOUS at 05:42

## 2022-08-16 RX ADMIN — HEPARIN SODIUM 5000 UNIT(S): 5000 INJECTION INTRAVENOUS; SUBCUTANEOUS at 22:18

## 2022-08-16 RX ADMIN — Medication 50 MILLIGRAM(S): at 17:52

## 2022-08-16 RX ADMIN — ATORVASTATIN CALCIUM 20 MILLIGRAM(S): 80 TABLET, FILM COATED ORAL at 22:18

## 2022-08-16 RX ADMIN — HEPARIN SODIUM 5000 UNIT(S): 5000 INJECTION INTRAVENOUS; SUBCUTANEOUS at 14:37

## 2022-08-16 NOTE — CHART NOTE - NSCHARTNOTEFT_GEN_A_CORE
PRE-INTERVENTIONAL RADIOLOGY PROCEDURE NOTE    Patient Age: 81y  Patient Gender: Female    Procedure: placement of tunnelled dialysis catheter, removal of Right groin Shiley    Diagnosis / Indication: ESRD    Interventional Radiology Attending Physician: Dr. Clark  Ordering Attending Physician: Radha Madera    Pertinent medical history: ESRD, Afib, HTN,    Pertinent labs:                       11.5   5.97  )-----------( 293      ( 16 Aug 2022 07:35 )             34.8       08-16    132<L>  |  91<L>  |  66<H>  ----------------------------<  86  4.1   |  23  |  11.22<H>    Ca    9.4      16 Aug 2022 07:35  Phos  4.7     08-16  Mg     2.20     08-16      Patient and Family aware? Yes      Bakari Barrett PA-C  Contact #: pgr [phone]

## 2022-08-16 NOTE — CONSULT NOTE ADULT - SUBJECTIVE AND OBJECTIVE BOX
CARDIOLOGY FELLOW CONSULT NOTE    HPI:  81 Yr old female HTN, Left arm lymphedema, reported hx of Afib not on AC, ESRD On Tu/Th/Sat through Right AVF, last HD session on Thursday.   Recent complications with preexisting R avf site with bleeding, and poor flow refractory to alteplase. This is first time her AVF has malfunctioned.     In ED, VSS, Trop 78->79, Cr 13.13, probnp [id number]. EKG: Afib . Two different EKGs were done however both seems to be Afib but rate controlled. Nephro and Vascular seen in ED. (08 Aug 2022 22:02)  Team plans on permacath tomorrow (wednesday) and AVF surgery thursday 8/17.     PMHx:   HTN (hypertension)  ESRD on hemodialysis  Lymphedema  Atrial fibrillation    PSHx:   S/P total knee arthroplasty    Allergies:  No Known Allergies    Home Meds:    Current Medications:   acetaminophen     Tablet .. 650 milliGRAM(s) Oral every 6 hours PRN  aluminum hydroxide/magnesium hydroxide/simethicone Suspension 30 milliLiter(s) Oral every 4 hours PRN  amLODIPine   Tablet 10 milliGRAM(s) Oral daily  atorvastatin 20 milliGRAM(s) Oral at bedtime  chlorhexidine 2% Cloths 1 Application(s) Topical daily  heparin   Injectable 5000 Unit(s) SubCutaneous every 8 hours  melatonin 3 milliGRAM(s) Oral at bedtime PRN  metoprolol tartrate 50 milliGRAM(s) Oral two times a day  ondansetron Injectable 4 milliGRAM(s) IV Push every 8 hours PRN    FAMILY HISTORY:  No pertinent family history in first degree relatives    Social History:  Smoking History:  Alcohol Use:  Drug Use:    REVIEW OF SYSTEMS: 14pt ros neg unless specified above    Physical Exam:  T(F): 97.9 (08-16), Max: 98.6 (08-15)  HR: 67 (08-16) (65 - 74)  BP: 133/72 (08-16) (113/68 - 133/72)  RR: 17 (08-16)  SpO2: 98% (08-16)  GENERAL: No acute distress, well-developed  HEAD:  Atraumatic, Normocephalic  ENT: EOMI, PERRLA, conjunctiva and sclera clear, Neck supple, No JVD, moist mucosa  CHEST/LUNG: Clear to auscultation bilaterally; No wheeze, equal breath sounds bilaterally   BACK: No spinal tenderness  HEART: Regular rate and rhythm; No murmurs, rubs, or gallops  ABDOMEN: Soft, Nontender, Nondistended; Bowel sounds present  EXTREMITIES:  No clubbing, cyanosis, or edema  PSYCH: Nl behavior, nl affect  NEUROLOGY: AAOx3, non-focal, cranial nerves intact  SKIN: Normal color, No rashes or lesions  LINES:    ECG: Personally reviewed    XR: cardiomegaly with R effusion    Echo:DIMENSIONS:  Dimensions:     Normal Values:  LA:     3.3 cm    2.0 - 4.0 cm  Ao:     2.7 cm    2.0 - 3.8 cm  SEPTUM: 1.0 cm    0.6 - 1.2 cm  PWT:    1.0 cm    0.6 - 1.1 cm  LVIDd:  4.0 cm    3.0 - 5.6 cm  LVIDs:    ---     1.8 - 4.0 cm  Derived Variables:  LVMI: 68 g/m2  RWT: 0.50  ------------------------------------------------------------------------  OBSERVATIONS:  Mitral Valve: Mitral annular calcification, otherwise  normal mitral valve. Minimal mitral regurgitation.  Aortic Root: Normal aortic root.  Aortic Valve: Calcified trileaflet aortic valve with  moderately decreased opening. Estimated aortic valve area  equals 1.6 sqcm (by continuity equation), consistent with  mild aortic stenosis. Minimal aortic regurgitation.  Left Atrium: Normal left atrium.  Left Ventricle: Endocardium not well visualized; grossly  normal left ventricular systolic function. Increased  relative wallthickness with normal left ventricular mass  index, consistent with concentric left ventricular  remodeling. (DT:195 ms).  Right Heart: Normal right atrium. Right ventricular  enlargement with normal right ventricular systolic  function. Normal tricuspid valve. Moderate tricuspid  regurgitation. Normal pulmonic valve.  Pericardium/PleuraNormal pericardium with no pericardial  effusion.  ------------------------------------------------------------------------  CONCLUSIONS:  1. Calcified trileafletaortic valve with moderately  decreased opening. Minimal aortic regurgitation.  2. Increased relative wall thickness with normal left  ventricular mass index, consistent with concentric left  ventricular remodeling.  3. Endocardium not well visualized; grossly normal left  ventricular systolic function.  4. Right ventricular enlargement with normal right  ventricular systolic function.  5. Normal tricuspid valve. Moderate tricuspid  regurgitation.  CXR: Personally reviewed    Labs: Personally reviewed                        11.5   5.97  )-----------( 293      ( 16 Aug 2022 07:35 )             34.8     CARDIAC MARKERS ( 09 Aug 2022 08:52 )  93 ng/L / x     / x     / x     / x     / x     CARDIOLOGY FELLOW CONSULT NOTE    HPI:  81 Yr old female HTN, breast CA s/p chemoradiation and mastectomy c/b L arm lymphedema, ESRD On Tu/Th/Sat through Right AVF  Recent complications with preexisting R avf site with bleeding, and poor flow refractory to alteplase. This is first time her AVF has malfunctioned.     In ED, VSS, Trop 78->79, Cr 13.13, probnp [id number]. EKG: Afib . Two different EKGs were done however both seems to be Afib but rate controlled. Nephro and Vascular seen in ED. (08 Aug 2022 22:02)  Team plans on permacath tomorrow (wednesday) and AVF surgery thursday 8/17.     On history taking, patient denies any active chest pain, either at rest, during dialysis, or on exertion. She usually ambulates about with walker. Isn't able to navigate flights of stairs but does report going to department stores with her daughter (who doubles as her HHA) and being able to manage the trip without difficulty.   She denies any prior cardiac history that she knows of. Chart documents history of pAF (for which she is not on AC).     PMHx:   HTN (hypertension)  ESRD on hemodialysis  Lymphedema  Atrial fibrillation    PSHx:   S/P total knee arthroplasty  Breast CA s/p mastectomy     Allergies:  No Known Allergies    Home Meds:    Current Medications:   acetaminophen     Tablet .. 650 milliGRAM(s) Oral every 6 hours PRN  aluminum hydroxide/magnesium hydroxide/simethicone Suspension 30 milliLiter(s) Oral every 4 hours PRN  amLODIPine   Tablet 10 milliGRAM(s) Oral daily  atorvastatin 20 milliGRAM(s) Oral at bedtime  chlorhexidine 2% Cloths 1 Application(s) Topical daily  heparin   Injectable 5000 Unit(s) SubCutaneous every 8 hours  melatonin 3 milliGRAM(s) Oral at bedtime PRN  metoprolol tartrate 50 milliGRAM(s) Oral two times a day  ondansetron Injectable 4 milliGRAM(s) IV Push every 8 hours PRN    FAMILY HISTORY:  No pertinent family history in first degree relatives    Social History:  Smoking History:  Alcohol Use:  Drug Use:    REVIEW OF SYSTEMS: 14pt ros neg unless specified above    Physical Exam:  T(F): 97.9 (08-16), Max: 98.6 (08-15)  HR: 67 (08-16) (65 - 74)  BP: 133/72 (08-16) (113/68 - 133/72)  RR: 17 (08-16)  SpO2: 98% (08-16)  GENERAL: No acute distress, well-developed. Soft spoken  HEAD:  Atraumatic, Normocephalic  ENT: EOMI, PERRLA, conjunctiva and sclera clear, Neck supple, No JVD, moist mucosa  CHEST/LUNG: Clear to auscultation bilaterally; No wheeze, equal breath sounds bilaterally   BACK: No spinal tenderness  HEART: regular, +JOEL   ABDOMEN: Soft, Nontender, Nondistended; Bowel sounds present  EXTREMITIES:  L arm lymphedema. R shiley   PSYCH: Nl behavior, nl affect  NEUROLOGY: AAOx3, non-focal, cranial nerves intact  SKIN: Normal color, No rashes or lesions  LINES:    ECG: Afib    XR: cardiomegaly with R effusion    Echo:DIMENSIONS:  Dimensions:     Normal Values:  LA:     3.3 cm    2.0 - 4.0 cm  Ao:     2.7 cm    2.0 - 3.8 cm  SEPTUM: 1.0 cm    0.6 - 1.2 cm  PWT:    1.0 cm    0.6 - 1.1 cm  LVIDd:  4.0 cm    3.0 - 5.6 cm  LVIDs:    ---     1.8 - 4.0 cm  Derived Variables:  LVMI: 68 g/m2  RWT: 0.50  ------------------------------------------------------------------------  OBSERVATIONS:  Mitral Valve: Mitral annular calcification, otherwise  normal mitral valve. Minimal mitral regurgitation.  Aortic Root: Normal aortic root.  Aortic Valve: Calcified trileaflet aortic valve with  moderately decreased opening. Estimated aortic valve area  equals 1.6 sqcm (by continuity equation), consistent with  mild aortic stenosis. Minimal aortic regurgitation.  Left Atrium: Normal left atrium.  Left Ventricle: Endocardium not well visualized; grossly  normal left ventricular systolic function. Increased  relative wallthickness with normal left ventricular mass  index, consistent with concentric left ventricular  remodeling. (DT:195 ms).  Right Heart: Normal right atrium. Right ventricular  enlargement with normal right ventricular systolic  function. Normal tricuspid valve. Moderate tricuspid  regurgitation. Normal pulmonic valve.  Pericardium/PleuraNormal pericardium with no pericardial  effusion.  ------------------------------------------------------------------------  CONCLUSIONS:  1. Calcified trileafletaortic valve with moderately  decreased opening. Minimal aortic regurgitation.  2. Increased relative wall thickness with normal left  ventricular mass index, consistent with concentric left  ventricular remodeling.  3. Endocardium not well visualized; grossly normal left  ventricular systolic function.  4. Right ventricular enlargement with normal right  ventricular systolic function.  5. Normal tricuspid valve. Moderate tricuspid  regurgitation.  CXR: Personally reviewed    Labs: Personally reviewed                        11.5   5.97  )-----------( 293      ( 16 Aug 2022 07:35 )             34.8     CARDIAC MARKERS ( 09 Aug 2022 08:52 )  93 ng/L / x     / x     / x     / x     / x     CARDIOLOGY FELLOW CONSULT NOTE    HPI:  81 Yr old female HTN, breast CA s/p chemoradiation and mastectomy c/b L arm lymphedema, ESRD On Tu/Th/Sat through Right AVF  Recent complications with preexisting R avf site with bleeding, and poor flow refractory to alteplase. This is first time her AVF has malfunctioned.     In ED, VSS, Trop 78->79, Cr 13.13, probnp [id number]. EKG: Afib . Two different EKGs were done however both seems to be Afib but rate controlled. Nephro and Vascular seen in ED. (08 Aug 2022 22:02)  Team plans on permacath tomorrow (wednesday) and AVF surgery thursday 8/17.     On history taking, patient denies any active chest pain, either at rest, during dialysis, or on exertion. She usually ambulates about with walker. Isn't able to navigate flights of stairs but does report going to department stores with her daughter (who doubles as her HHA) and being able to manage the trip without difficulty.   She denies any prior cardiac history that she knows of. Chart documents history of pAF (for which she is not on AC). She says someone has told her she has an irregular rhythm before. She has an outpatient cardiologist who she cannot recall name of who preop'd her prior to knee surgery. She has never been on AC. She denies history of GI bleed or CVAs.     PMHx:   HTN (hypertension)  ESRD on hemodialysis  Lymphedema  Atrial fibrillation    PSHx:   S/P total knee arthroplasty  Breast CA s/p mastectomy     Allergies:  No Known Allergies    Home Meds:    Current Medications:   acetaminophen     Tablet .. 650 milliGRAM(s) Oral every 6 hours PRN  aluminum hydroxide/magnesium hydroxide/simethicone Suspension 30 milliLiter(s) Oral every 4 hours PRN  amLODIPine   Tablet 10 milliGRAM(s) Oral daily  atorvastatin 20 milliGRAM(s) Oral at bedtime  chlorhexidine 2% Cloths 1 Application(s) Topical daily  heparin   Injectable 5000 Unit(s) SubCutaneous every 8 hours  melatonin 3 milliGRAM(s) Oral at bedtime PRN  metoprolol tartrate 50 milliGRAM(s) Oral two times a day  ondansetron Injectable 4 milliGRAM(s) IV Push every 8 hours PRN    FAMILY HISTORY:  No pertinent family history in first degree relatives    Social History: Nonsmoker, nondrinker   REVIEW OF SYSTEMS: 14pt ros neg unless specified above    Physical Exam:  T(F): 97.9 (08-16), Max: 98.6 (08-15)  HR: 67 (08-16) (65 - 74)  BP: 133/72 (08-16) (113/68 - 133/72)  RR: 17 (08-16)  SpO2: 98% (08-16)  GENERAL: No acute distress, well-developed. Soft spoken  HEAD:  Atraumatic, Normocephalic  ENT: EOMI, PERRLA, conjunctiva and sclera clear, Neck supple, No JVD, moist mucosa  CHEST/LUNG: Clear to auscultation bilaterally; No wheeze, equal breath sounds bilaterally   BACK: No spinal tenderness  HEART: regular, +JOEL   ABDOMEN: Soft, Nontender, Nondistended; Bowel sounds present  EXTREMITIES:  L arm lymphedema. R shiley   PSYCH: Nl behavior, nl affect  NEUROLOGY: AAOx3, non-focal, cranial nerves intact  SKIN: Normal color, No rashes or lesions  LINES:    ECG: Afib    XR: cardiomegaly with R effusion    Echo:DIMENSIONS:  Dimensions:     Normal Values:  LA:     3.3 cm    2.0 - 4.0 cm  Ao:     2.7 cm    2.0 - 3.8 cm  SEPTUM: 1.0 cm    0.6 - 1.2 cm  PWT:    1.0 cm    0.6 - 1.1 cm  LVIDd:  4.0 cm    3.0 - 5.6 cm  LVIDs:    ---     1.8 - 4.0 cm  Derived Variables:  LVMI: 68 g/m2  RWT: 0.50  ------------------------------------------------------------------------  OBSERVATIONS:  Mitral Valve: Mitral annular calcification, otherwise  normal mitral valve. Minimal mitral regurgitation.  Aortic Root: Normal aortic root.  Aortic Valve: Calcified trileaflet aortic valve with  moderately decreased opening. Estimated aortic valve area  equals 1.6 sqcm (by continuity equation), consistent with  mild aortic stenosis. Minimal aortic regurgitation.  Left Atrium: Normal left atrium.  Left Ventricle: Endocardium not well visualized; grossly  normal left ventricular systolic function. Increased  relative wallthickness with normal left ventricular mass  index, consistent with concentric left ventricular  remodeling. (DT:195 ms).  Right Heart: Normal right atrium. Right ventricular  enlargement with normal right ventricular systolic  function. Normal tricuspid valve. Moderate tricuspid  regurgitation. Normal pulmonic valve.  Pericardium/PleuraNormal pericardium with no pericardial  effusion.  ------------------------------------------------------------------------  CONCLUSIONS:  1. Calcified trileafletaortic valve with moderately  decreased opening. Minimal aortic regurgitation.  2. Increased relative wall thickness with normal left  ventricular mass index, consistent with concentric left  ventricular remodeling.  3. Endocardium not well visualized; grossly normal left  ventricular systolic function.  4. Right ventricular enlargement with normal right  ventricular systolic function.  5. Normal tricuspid valve. Moderate tricuspid  regurgitation.  CXR: Personally reviewed    Labs: Personally reviewed                        11.5   5.97  )-----------( 293      ( 16 Aug 2022 07:35 )             34.8     CARDIAC MARKERS ( 09 Aug 2022 08:52 )  93 ng/L / x     / x     / x     / x     / x

## 2022-08-16 NOTE — PROGRESS NOTE ADULT - ASSESSMENT
81 Yr old female HTN, Left arm lymphedema, reported hx of Afib not on AC, ESRD On Tu/Th/Sat, Right AVF last HD session on Thursday now p/w R chest pain reproducible on palpation after TPA of her fistula. Admitted for new vascular access for dialysis given R AVF failure and chest pain work up. IR to place permcath on 8/17, Vascular planning AVF Friday inpatient.  RCRI on 8/15: Class II Risk, 6.0% 30 day risk of cardiac arrest, MI or death. Cardiology consulted for additional dejan-op risk assessment on 8/15.

## 2022-08-16 NOTE — CONSULT NOTE ADULT - ATTENDING COMMENTS
Patient seen and evaluated at bedside this morning in the dialysis unit.  She was tolerating treatment well.  She denied chest pain shortness of breath on my evaluation.  Catheter only achieved 200 blood flow.  will need to replace catheter definitive access planning as per vascular.
The patient was seen and examined with the Cardiology Consultation Teaching Service.     She is an 81-year-old woman with end-stage renal disease on hemodialysis, atrial fibrillation, hypertension, a history of breast cancer treated with chemotherapy, radiation and mastectomy, who was admitted after her AVF started to malfunction. We are consulted for perioperative risk stratification prior to new AVF formation.     No chest pain  No dyspnea, orthopnea or PND  No palpitations or dizziness     Reports the ability to walk around large department stores with her daughter without chest pain or significant dyspnea.    Comfortable-appearing woman in no acute distress  Alert and oriented  Afebrile  Vital signs stable  JVP is not elevated  Clear lungs  Systolic murmur  Soft, non-tender, non-distended abdomen  Extremities are warm and perfused  Left arm lymphedema  No lower extremity edema    Normal blood counts  Hyponatremia 132  Hypochloremia  GFR 3    hs-troponin 78, 79, 93  pro-BNP 13K    Telemetry demonstrates what appears to be atrial fibrillation  ECG demonstrates what appears to be atrial fibrillation  Echocardiography demonstrated normal LV function, concentric LVH, minimal AI, normal LA size    Impression and Recommendations:   81-year-old woman with end-stage renal disease on hemodialysis, atrial fibrillation, hypertension, a history of breast cancer treated with chemotherapy, radiation and mastectomy, who was admitted after her AVF started to malfunction. We are consulted for perioperative risk stratification prior to new AVF formation.     There are no absolute contraindications to the planned procedure. There is no evidence of ongoing myocardial ischemia, decompensated heart failure or unstable cardiac arrhythmia. Her mildly elevated hs-troponin are a non-specific finding in end-stage renal disease, and do not represent acute coronary syndrome or myocardial infarction.     The patient appears to be in rate controlled atrial fibrillation. Please continue beta-blocker throughout the perioperative period. The patient was not previously on anticoagulation, but it is unclear why. Given her stroke risk, anticoagulation is recommended, but this can be initiated after the patient's planned procedure and when it is safe from a post-surgical perspective. Prior to initiation, would clarify with the patient's outpatient cardiologist whether anticoagulation was stopped for a specific reason. If anticoagulation is initiated, the preferred agent in a patient on hemodialysis would be warfarin.     The patient's Revised Cardiac Risk Index estimates a 6.0% 30-day risk of death, MI or cardiac arrest. The patient's Melara Perioperative Risk is 3.1% for 30-day risk of myocardial infarction or cardiac arrest.     These risk estimates should be incorporated into a shared decision making conversation between the patient or their surrogate and the performing practitioner regarding the risks, benefits and alternatives to the procedure and whether to proceed. Additional cardiac testing is unlikely to change this risk assessment or procedural outcomes from a cardiac perspective.     Please call cardiology with additional questions or concerns.    Sourav Carrillo MD  Cardiology  [id number]
82 yo F with ESRD dependent on HD  [facility] and retrograde angiocaths secured within fistula following failed attempt at de-clot  these were removed at bedside by vascular house staff and sutures placed within skin  AVF is aneurysmal and thrombosed on exam  patient will need evaluation for new dialysis access creation  likely will need temporary dialysis catheter placed  obtain bilateral upper extremity vein mapping  will plan for dialysis access creation next week  will follow

## 2022-08-16 NOTE — PROGRESS NOTE ADULT - SUBJECTIVE AND OBJECTIVE BOX
Vascular Surgery Progress Note    Subjective/24hour Events:   Patient seen and examined.      Vital Signs:  Vital Signs Last 24 Hrs  T(C): 36.6 (16 Aug 2022 07:00), Max: 37 (15 Aug 2022 17:40)  T(F): 97.9 (16 Aug 2022 07:00), Max: 98.6 (15 Aug 2022 17:40)  HR: 67 (16 Aug 2022 07:00) (65 - 74)  BP: 133/72 (16 Aug 2022 07:00) (113/68 - 133/72)  BP(mean): --  RR: 17 (16 Aug 2022 07:00) (17 - 18)  SpO2: 98% (16 Aug 2022 05:30) (97% - 100%)    Parameters below as of 16 Aug 2022 07:00  Patient On (Oxygen Delivery Method): room air        CAPILLARY BLOOD GLUCOSE          I&O's Detail      Physical Exam:  Gen: NAD, resting comfortably in bed  CV: Regular rate  Resp: Nonlabored breathing on room air  Ext: RUE without thrill or pulse palpated in fistula; b/l upper extremities are protected      Labs:    08-16    132<L>  |  91<L>  |  66<H>  ----------------------------<  86  4.1   |  23  |  11.22<H>    Ca    9.4      16 Aug 2022 07:35  Phos  4.7     08-16  Mg     2.20     08-16                              11.5   5.97  )-----------( 293      ( 16 Aug 2022 07:35 )             34.8

## 2022-08-16 NOTE — PROGRESS NOTE ADULT - ASSESSMENT
81y F PMH HTN, Left arm lymphedema, ESRD on HD via RUE AVF (Tu/Th/Sa), presenting with malfunctioning AVF and chest pain s/p TPA injection into AVF.     RUE AVF duplex appreciated.    Vein mapping appreciated.    Plan:  - Plan for new AVF creation this Thursday  - Will obtain consent  - Would appreciate medical and cardiac optimization  - HD via R groin Shiley  - IR consult for permacath placement  - Remainder of care per primary team      Vascular (C Team) Surgery  [id number] 81y F PMH HTN, Left arm lymphedema, ESRD on HD via RUE AVF (Tu/Th/Sa), presenting with malfunctioning AVF and chest pain s/p TPA injection into AVF.     RUE AVF duplex appreciated.    Vein mapping appreciated.    Plan:  - Plan for new AVF creation this Friday  - Will obtain consent  - Would appreciate medical and cardiac optimization  - HD via R bradley S[place]  - IR consult for permacath placement  - Remainder of care per primary team      Vascular (C Team) Surgery  [id number]

## 2022-08-16 NOTE — PROGRESS NOTE ADULT - SUBJECTIVE AND OBJECTIVE BOX
[facility] Division of Hospital Medicine  Radha Zhu MD  Pager (M-F, 8A-5P): [phone]  Other Times:  [phone]      SUBJECTIVE / OVERNIGHT EVENTS: Pt feels fine this am, went for HD. No issues with her shiley at present. Afebrile.     MEDICATIONS  (STANDING):  amLODIPine   Tablet 10 milliGRAM(s) Oral daily  atorvastatin 20 milliGRAM(s) Oral at bedtime  chlorhexidine 2% Cloths 1 Application(s) Topical daily  heparin   Injectable 5000 Unit(s) SubCutaneous every 8 hours  metoprolol tartrate 50 milliGRAM(s) Oral two times a day    MEDICATIONS  (PRN):  acetaminophen     Tablet .. 650 milliGRAM(s) Oral every 6 hours PRN Temp greater or equal to 38C (100.4F), Mild Pain (1 - 3)  aluminum hydroxide/magnesium hydroxide/simethicone Suspension 30 milliLiter(s) Oral every 4 hours PRN Dyspepsia  melatonin 3 milliGRAM(s) Oral at bedtime PRN Insomnia  ondansetron Injectable 4 milliGRAM(s) IV Push every 8 hours PRN Nausea and/or Vomiting      I&O's Summary    16 Aug 2022 07:01  -  16 Aug 2022 15:30  --------------------------------------------------------  IN: 600 mL / OUT: 1900 mL / NET: -1300 mL        PHYSICAL EXAM:  Vital Signs Last 24 Hrs  T(C): 36.8 (16 Aug 2022 10:40), Max: 37.1 (16 Aug 2022 10:15)  T(F): 98.2 (16 Aug 2022 10:40), Max: 98.7 (16 Aug 2022 10:15)  HR: 76 (16 Aug 2022 10:40) (65 - 76)  BP: 114/66 (16 Aug 2022 10:40) (113/67 - 133/72)  BP(mean): --  RR: 18 (16 Aug 2022 10:40) (17 - 18)  SpO2: 99% (16 Aug 2022 10:40) (97% - 100%)    Parameters below as of 16 Aug 2022 10:40  Patient On (Oxygen Delivery Method): room air      CONSTITUTIONAL: NAD, well-developed, well-groomed  EYES: EOMI; conjunctiva and sclera clear  ENMT: Moist oral mucosa, normal dentition  NECK: Supple  RESPIRATORY: Normal respiratory effort; lungs are clear to auscultation bilaterally  CARDIOVASCULAR: Regular rate and rhythm, normal S1 and S2, No lower extremity edema; Peripheral pulses are 2+ bilaterally  ABDOMEN: Nontender to palpation, normoactive bowel sounds, no rebound/guarding  PSYCH: calm, affect appropriate  NEUROLOGY: moving all extremities, no focal deficits, no gross sensory deficits   SKIN: No rashes  +R groin [facility]    LABS:                        11.5   5.97  )-----------( 293      ( 16 Aug 2022 07:35 )             34.8     08-16    132<L>  |  91<L>  |  66<H>  ----------------------------<  86  4.1   |  23  |  11.22<H>    Ca    9.4      16 Aug 2022 07:35  Phos  4.7     08-16  Mg     2.20     08-16                  RADIOLOGY & ADDITIONAL TESTS:  Results Reviewed:   Imaging Personally Reviewed:  Electrocardiogram Personally Reviewed:    COORDINATION OF CARE:  Care Discussed with Consultants/Other Providers [Y/N]: nephro vasc  Prior or Outpatient Records Reviewed [Y/N]:

## 2022-08-16 NOTE — PROGRESS NOTE ADULT - SUBJECTIVE AND OBJECTIVE BOX
[facility] DIVISION OF KIDNEY DISEASES AND HYPERTENSION -- FOLLOW UP NOTE  --------------------------------------------------------------------------------  Chief Complaint: ESRD on HD    24 hour events/subjective:  Patient tolerated HD well this AM. No complaints.       PAST HISTORY  --------------------------------------------------------------------------------  No significant changes to PMH, PSH, FHx, SHx, unless otherwise noted    ALLERGIES & MEDICATIONS  --------------------------------------------------------------------------------  Allergies    No Known Allergies    Intolerances      Standing Inpatient Medications  amLODIPine   Tablet 10 milliGRAM(s) Oral daily  atorvastatin 20 milliGRAM(s) Oral at bedtime  chlorhexidine 2% Cloths 1 Application(s) Topical daily  heparin   Injectable 5000 Unit(s) SubCutaneous every 8 hours  metoprolol tartrate 50 milliGRAM(s) Oral two times a day    PRN Inpatient Medications  acetaminophen     Tablet .. 650 milliGRAM(s) Oral every 6 hours PRN  aluminum hydroxide/magnesium hydroxide/simethicone Suspension 30 milliLiter(s) Oral every 4 hours PRN  melatonin 3 milliGRAM(s) Oral at bedtime PRN  ondansetron Injectable 4 milliGRAM(s) IV Push every 8 hours PRN      REVIEW OF SYSTEMS  --------------------------------------------------------------------------------  Gen: no fever  Respiratory: no sob  CV: no cp  GI: no ab pain  : no complaints  MSK: no pain      All other systems were reviewed and are negative, except as noted.    VITALS/PHYSICAL EXAM  --------------------------------------------------------------------------------  T(C): 36.8 (08-16-22 @ 10:40), Max: 37.1 (08-16-22 @ 10:15)  HR: 76 (08-16-22 @ 10:40) (65 - 76)  BP: 114/66 (08-16-22 @ 10:40) (113/67 - 133/72)  RR: 18 (08-16-22 @ 10:40) (17 - 18)  SpO2: 99% (08-16-22 @ 10:40) (97% - 100%)  Wt(kg): --        08-16-22 @ 07:01  -  08-16-22 @ 16:39  --------------------------------------------------------  IN: 600 mL / OUT: 1900 mL / NET: -1300 mL      Physical Exam:              Gen: distress  	HEENT: anicteric  	Pulm: CTA B/L  	CV: RRR, S1S2; no rub  	Abd: non tender  	LE: no edema  	Neuro: awake alert  	Psych: Normal affect and mood  	Skin: dry  	Vascular access: MESFINE OLMANF dressing in place w/o bruit thrill.  R femoral HD shiley catheter site CDI      LABS/STUDIES  --------------------------------------------------------------------------------              11.5   5.97  >-----------<  293      [08-16-22 @ 07:35]              34.8     132  |  91  |  66  ----------------------------<  86      [08-16-22 @ 07:35]  4.1   |  23  |  11.22        Ca     9.4     [08-16-22 @ 07:35]      Mg     2.20     [08-16-22 @ 07:35]      Phos  4.7     [08-16-22 @ 07:35]            Creatinine Trend:  SCr 11.22 [08-16 @ 07:35]  SCr 12.54 [08-13 @ 06:33]  SCr 8.20 [08-11 @ 06:35]  SCr 13.77 [08-09 @ 06:13]  SCr 13.13 [08-08 @ 17:30]          HBsAb 15.6      [08-09-22 @ 06:13]  HBsAg Nonreact      [08-09-22 @ 06:13]  HBcAb Nonreact      [08-09-22 @ 06:13]  HCV 0.22, Nonreact      [08-09-22 @ 06:13]

## 2022-08-16 NOTE — CONSULT NOTE ADULT - ASSESSMENT
Cardiovascular Risk Stratification - RCRI =  ().  1. History of ischemic heart disease:   2. History of congestive heart failure:   3. History of cerebrovascular disease (stroke or transient ischemic attack):   4. History of diabetes requiring preoperative insulin use:   5. Chronic kidney disease (creatinine > 2 mg/dL):   6. Undergoing suprainguinal vascular, intraperitoneal, or intrathoracic surgery:   0 predictors = 0.4%, 1 predictor = 0.9%, 2 predictors = 6.6%, =3 predictors = >11%    - Overall this patient is as __% risk (for cardiac death, nonfatal myocardial infarction, and nonfatal cardiac arrest perioperatively for this __risk procedure). 81 Yr old female HTN, breast CA s/p chemoradiation and mastectomy c/b L arm lymphedema, ESRD On Tu/Th/Sat through Right AVF, who presents for malfunctioning AVF  Cardiology consulted for perioperative evaluation prior to intended AVF creation by vascular surgery on Thursday 8/18.    Cardiovascular Risk Stratification - RCRI =  ().  1. History of ischemic heart disease: No  2. History of congestive heart failure: No  3. History of cerebrovascular disease (stroke or transient ischemic attack): No  4. History of diabetes requiring preoperative insulin use: No  5. Chronic kidney disease (creatinine > 2 mg/dL): Yes  6. Undergoing suprainguinal vascular, intraperitoneal, or intrathoracic surgery: No  0 predictors = 0.4%, 1 predictor = 0.9%, 2 predictors = 6.6%, =3 predictors = >11%    Plan:  - Overall this patient is as _0.9_% risk (for cardiac death, nonfatal myocardial infarction, and nonfatal cardiac arrest perioperatively for this _low_ risk procedure)  - No evidence of ACS or decompensated heart failure.   - As it relates to patient's pAF, she has a kwiak2hgmu of 4. She is currently rate controlled (HR 60s-70s). Would start heparin gtt perioperatively and start DOAC (reduced dosing) 24-48h after AVF surgery w/ vascular  81 Yr old female HTN, breast CA s/p chemoradiation and mastectomy c/b L arm lymphedema, ESRD On Tu/Th/Sat through Right AVF, who presents for malfunctioning AVF  Cardiology consulted for perioperative evaluation prior to intended AVF creation by vascular surgery on Thursday 8/18.    Cardiovascular Risk Stratification - RCRI =  ().  1. History of ischemic heart disease: No  2. History of congestive heart failure: No  3. History of cerebrovascular disease (stroke or transient ischemic attack): No  4. History of diabetes requiring preoperative insulin use: No  5. Chronic kidney disease (creatinine > 2 mg/dL): Yes  6. Undergoing suprainguinal vascular, intraperitoneal, or intrathoracic surgery: No  0 predictors = 0.4%, 1 predictor = 0.9%, 2 predictors = 6.6%, =3 predictors = >11%    Plan:  - Overall this patient is as _0.9_% risk (for cardiac death, nonfatal myocardial infarction, and nonfatal cardiac arrest perioperatively for this _low_ risk procedure)  - No evidence of ACS or decompensated heart failure.   - As it relates to patient's pAF, she has a yefbr6jdgt of 4. She is currently rate controlled (HR 60s-70s). Would start heparin gtt perioperatively and start Coumadin 24-48h after AVF surgery w/ vascular  81 Yr old female HTN, breast CA s/p chemoradiation and mastectomy c/b L arm lymphedema, ESRD On Tu/Th/Sat through Right AVF, who presents for malfunctioning AVF  Cardiology consulted for perioperative evaluation prior to intended AVF creation by vascular surgery on Thursday 8/18.    Cardiovascular Risk Stratification - RCRI =  ().  1. History of ischemic heart disease: No  2. History of congestive heart failure: No  3. History of cerebrovascular disease (stroke or transient ischemic attack): No  4. History of diabetes requiring preoperative insulin use: No  5. Chronic kidney disease (creatinine > 2 mg/dL): Yes  6. Undergoing suprainguinal vascular, intraperitoneal, or intrathoracic surgery: No  0 predictors = 0.4%, 1 predictor = 0.9%, 2 predictors = 6.6%, =3 predictors = >11%    Plan:  - Overall this patient is as _0.9_% risk (for cardiac death, nonfatal myocardial infarction, and nonfatal cardiac arrest perioperatively for this _low_ risk procedure)  - No evidence of ACS or decompensated heart failure.   - As it relates to patient's pAF, she has a agihv0cegz of 4. She is currently rate controlled (HR 60s-70s). AC plan as per Dr. Carrillo below

## 2022-08-16 NOTE — CONSULT NOTE ADULT - CONSULT REASON
AVF dysfunction
Perioperative eval prior to AVFistula
tunneled dialysis catheter placement, R groin Shiley removal
ESRD

## 2022-08-17 LAB
ANION GAP SERPL CALC-SCNC: 18 MMOL/L — HIGH (ref 7–14)
BASOPHILS # BLD AUTO: 0.04 K/UL — SIGNIFICANT CHANGE UP (ref 0–0.2)
BASOPHILS NFR BLD AUTO: 0.8 % — SIGNIFICANT CHANGE UP (ref 0–2)
BUN SERPL-MCNC: 51 MG/DL — HIGH (ref 7–23)
CALCIUM SERPL-MCNC: 9.7 MG/DL — SIGNIFICANT CHANGE UP (ref 8.4–10.5)
CHLORIDE SERPL-SCNC: 92 MMOL/L — LOW (ref 98–107)
CO2 SERPL-SCNC: 22 MMOL/L — SIGNIFICANT CHANGE UP (ref 22–31)
CREAT SERPL-MCNC: 9.68 MG/DL — HIGH (ref 0.5–1.3)
EGFR: 4 ML/MIN/1.73M2 — LOW
EOSINOPHIL # BLD AUTO: 0.13 K/UL — SIGNIFICANT CHANGE UP (ref 0–0.5)
EOSINOPHIL NFR BLD AUTO: 2.6 % — SIGNIFICANT CHANGE UP (ref 0–6)
GLUCOSE SERPL-MCNC: 85 MG/DL — SIGNIFICANT CHANGE UP (ref 70–99)
HCT VFR BLD CALC: 36.8 % — SIGNIFICANT CHANGE UP (ref 34.5–45)
HGB BLD-MCNC: 12 G/DL — SIGNIFICANT CHANGE UP (ref 11.5–15.5)
IANC: 2.68 K/UL — SIGNIFICANT CHANGE UP (ref 1.8–7.4)
IMM GRANULOCYTES NFR BLD AUTO: 2 % — HIGH (ref 0–1.5)
LYMPHOCYTES # BLD AUTO: 1.48 K/UL — SIGNIFICANT CHANGE UP (ref 1–3.3)
LYMPHOCYTES # BLD AUTO: 29.2 % — SIGNIFICANT CHANGE UP (ref 13–44)
MAGNESIUM SERPL-MCNC: 2.2 MG/DL — SIGNIFICANT CHANGE UP (ref 1.6–2.6)
MCHC RBC-ENTMCNC: 30.5 PG — SIGNIFICANT CHANGE UP (ref 27–34)
MCHC RBC-ENTMCNC: 32.6 GM/DL — SIGNIFICANT CHANGE UP (ref 32–36)
MCV RBC AUTO: 93.4 FL — SIGNIFICANT CHANGE UP (ref 80–100)
MONOCYTES # BLD AUTO: 0.64 K/UL — SIGNIFICANT CHANGE UP (ref 0–0.9)
MONOCYTES NFR BLD AUTO: 12.6 % — SIGNIFICANT CHANGE UP (ref 2–14)
NEUTROPHILS # BLD AUTO: 2.68 K/UL — SIGNIFICANT CHANGE UP (ref 1.8–7.4)
NEUTROPHILS NFR BLD AUTO: 52.8 % — SIGNIFICANT CHANGE UP (ref 43–77)
NRBC # BLD: 0 /100 WBCS — SIGNIFICANT CHANGE UP (ref 0–0)
NRBC # FLD: 0 K/UL — SIGNIFICANT CHANGE UP (ref 0–0)
PHOSPHATE SERPL-MCNC: 5.1 MG/DL — HIGH (ref 2.5–4.5)
PLATELET # BLD AUTO: 266 K/UL — SIGNIFICANT CHANGE UP (ref 150–400)
POTASSIUM SERPL-MCNC: 4.3 MMOL/L — SIGNIFICANT CHANGE UP (ref 3.5–5.3)
POTASSIUM SERPL-SCNC: 4.3 MMOL/L — SIGNIFICANT CHANGE UP (ref 3.5–5.3)
RBC # BLD: 3.94 M/UL — SIGNIFICANT CHANGE UP (ref 3.8–5.2)
RBC # FLD: 15.6 % — HIGH (ref 10.3–14.5)
SODIUM SERPL-SCNC: 132 MMOL/L — LOW (ref 135–145)
WBC # BLD: 5.07 K/UL — SIGNIFICANT CHANGE UP (ref 3.8–10.5)
WBC # FLD AUTO: 5.07 K/UL — SIGNIFICANT CHANGE UP (ref 3.8–10.5)

## 2022-08-17 PROCEDURE — 77001 FLUOROGUIDE FOR VEIN DEVICE: CPT | Mod: 26,GC

## 2022-08-17 PROCEDURE — 36558 INSERT TUNNELED CV CATH: CPT

## 2022-08-17 PROCEDURE — 99232 SBSQ HOSP IP/OBS MODERATE 35: CPT

## 2022-08-17 PROCEDURE — 76937 US GUIDE VASCULAR ACCESS: CPT | Mod: 26

## 2022-08-17 PROCEDURE — 99232 SBSQ HOSP IP/OBS MODERATE 35: CPT | Mod: 57

## 2022-08-17 RX ORDER — SODIUM CHLORIDE 9 MG/ML
10 INJECTION INTRAMUSCULAR; INTRAVENOUS; SUBCUTANEOUS
Refills: 0 | Status: DISCONTINUED | OUTPATIENT
Start: 2022-08-17 | End: 2022-08-18

## 2022-08-17 RX ORDER — CHLORHEXIDINE GLUCONATE 213 G/1000ML
1 SOLUTION TOPICAL
Refills: 0 | Status: DISCONTINUED | OUTPATIENT
Start: 2022-08-17 | End: 2022-08-18

## 2022-08-17 RX ADMIN — Medication 50 MILLIGRAM(S): at 06:22

## 2022-08-17 RX ADMIN — CHLORHEXIDINE GLUCONATE 1 APPLICATION(S): 213 SOLUTION TOPICAL at 21:59

## 2022-08-17 RX ADMIN — CHLORHEXIDINE GLUCONATE 1 APPLICATION(S): 213 SOLUTION TOPICAL at 12:10

## 2022-08-17 RX ADMIN — Medication 50 MILLIGRAM(S): at 17:52

## 2022-08-17 RX ADMIN — AMLODIPINE BESYLATE 10 MILLIGRAM(S): 2.5 TABLET ORAL at 06:23

## 2022-08-17 RX ADMIN — ATORVASTATIN CALCIUM 20 MILLIGRAM(S): 80 TABLET, FILM COATED ORAL at 21:59

## 2022-08-17 NOTE — CHART NOTE - NSCHARTNOTEFT_GEN_A_CORE
Patient re-evaluated.  RUE with thrombosed AVF and LUE with significant lymphedema.  Will plan for outpatient scheduling of complex dialysis access creation  Please have patient followup with Dr. Atkinson 2 weeks after discharge  Continue RUE precautions for future dialysis access creation  PermCath by IR  Patient understands and is in agreement  Plan discussed with primary medical team    Discussed with attending, Dr. Atkinson

## 2022-08-17 NOTE — PROGRESS NOTE ADULT - PROBLEM SELECTOR PLAN 5
Continue home medications

## 2022-08-17 NOTE — PROGRESS NOTE ADULT - PROBLEM SELECTOR PLAN 2
- appreciate renal recs  - HD as per renal  - Appreciate vascular surgery input, s/p marcia manipulation 8/13 with subsequent HD  - plan for new AVF tentatively Thursday
- appreciate renal recs  - HD as per renal  - IR to exchange HD catheter today, plan for HD later today after catheter change
- appreciate renal recs  - HD as per renal  - current HD access is R bradley - IR to place permacath early next week
- appreciate renal recs  - HD as per renal
- appreciate renal recs  - HD as per renal
- appreciate renal recs  - HD as per renal  - Appreciate vascular surgery input, s/p marcia manipulation 8/13 with subsequent HD  - plan for new AVF tentatively Thursday
- appreciate renal recs  - HD as per renal  - Appreciate vascular surgery input, s/p marcia manipulation 8/13 with subsequent HD  - plan for new AVF tentatively Thursday
- appreciate renal recs  - HD as per renal
- appreciate renal recs  - HD as per renal  - Appreciate vascular surgery input, s/p marcia manipulation 8/13 with subsequent HD  - plan for new AVF tentatively Thursday

## 2022-08-17 NOTE — PROGRESS NOTE ADULT - NUTRITIONAL ASSESSMENT
This patient has been assessed with a concern for Malnutrition and has been determined to have a diagnosis/diagnoses of Moderate protein-calorie malnutrition.    This patient is being managed with:   Diet NPO after Midnight-     NPO Start Date: 16-Aug-2022   NPO Start Time: 23:59  Entered: Aug 16 2022  7:37AM    Diet Regular-  For patients receiving Renal Replacement - No Protein Restr No Conc K No Conc Phos Low Sodium (RENAL)  Supplement Feeding Modality:  Oral  Nepro Cans or Servings Per Day:  1       Frequency:  Daily  Entered: Aug 15 2022  3:53PM    
This patient has been assessed with a concern for Malnutrition and has been determined to have a diagnosis/diagnoses of Moderate protein-calorie malnutrition.    This patient is being managed with:   Diet NPO after Midnight-     NPO Start Date: 16-Aug-2022   NPO Start Time: 23:59  Entered: Aug 16 2022  7:37AM    Diet Regular-  For patients receiving Renal Replacement - No Protein Restr No Conc K No Conc Phos Low Sodium (RENAL)  Supplement Feeding Modality:  Oral  Nepro Cans or Servings Per Day:  1       Frequency:  Daily  Entered: Aug 15 2022  3:53PM    
This patient has been assessed with a concern for Malnutrition and has been determined to have a diagnosis/diagnoses of Moderate protein-calorie malnutrition.    This patient is being managed with:   Diet NPO after Midnight-     NPO Start Date: 16-Aug-2022   NPO Start Time: 23:59  Except Medications  Entered: Aug 17 2022  4:42AM    Diet NPO after Midnight-     NPO Start Date: 16-Aug-2022   NPO Start Time: 23:59  Entered: Aug 16 2022  7:37AM    Diet Regular-  For patients receiving Renal Replacement - No Protein Restr No Conc K No Conc Phos Low Sodium (RENAL)  Supplement Feeding Modality:  Oral  Nepro Cans or Servings Per Day:  1       Frequency:  Daily  Entered: Aug 15 2022  3:53PM    
This patient has been assessed with a concern for Malnutrition and has been determined to have a diagnosis/diagnoses of Moderate protein-calorie malnutrition.    This patient is being managed with:   Diet Regular-  For patients receiving Renal Replacement - No Protein Restr No Conc K No Conc Phos Low Sodium (RENAL)  Supplement Feeding Modality:  Oral  Nepro Cans or Servings Per Day:  1       Frequency:  Daily  Entered: Aug 15 2022  3:53PM    
This patient has been assessed with a concern for Malnutrition and has been determined to have a diagnosis/diagnoses of Moderate protein-calorie malnutrition.    This patient is being managed with:   Diet NPO after Midnight-     NPO Start Date: 16-Aug-2022   NPO Start Time: 23:59  Except Medications  Entered: Aug 17 2022  4:42AM    Diet NPO after Midnight-     NPO Start Date: 16-Aug-2022   NPO Start Time: 23:59  Entered: Aug 16 2022  7:37AM    Diet Regular-  For patients receiving Renal Replacement - No Protein Restr No Conc K No Conc Phos Low Sodium (RENAL)  Supplement Feeding Modality:  Oral  Nepro Cans or Servings Per Day:  1       Frequency:  Daily  Entered: Aug 15 2022  3:53PM

## 2022-08-17 NOTE — PROGRESS NOTE ADULT - ATTENDING COMMENTS
long term HD planning  will plan for OR tomorrow versus Friday pending readiness for L AVF, possible AVG  will follow

## 2022-08-17 NOTE — PROGRESS NOTE ADULT - ASSESSMENT
81y F PMH HTN, Left arm lymphedema, ESRD on HD via RUE AVF (Tu/Th/Sa), presenting with malfunctioning AVF and chest pain s/p TPA injection into AVF.     RUE AVF duplex appreciated.    Vein mapping appreciated.    Plan:  - Plan for new AVF creation this Friday  - Consent signed and placed in chart  - Cardiac optimization documented  - HD tomorrow prior to surgery  - NPO after midnight tomorrow night  - Please obtain 1 AM preop labs on Friday morning (CBC, BMP, Mag, Phos, coags)  - Please maintain active COVID and T+S within 72 hours of the procedure  - HD via R groin Shiley  - IR for permacath placement  - Remainder of care per primary team      Vascular (C Team) Surgery  [id number]

## 2022-08-17 NOTE — PROGRESS NOTE ADULT - ASSESSMENT
81 Yr old female HTN, Left arm lymphedema, reported hx of Afib not on AC, ESRD On Tu/Th/Sat, Right AVF last HD session on Thursday now p/w R chest pain reproducible on palpation after TPA of her fistula. Admitted for new vascular access for dialysis given R AVF failure and chest pain work up. IR to place permcath on 8/17, R femoral shiley removal on 8/17, Vascular planning AVF Friday inpatient.  RCRI on 8/15: Class II Risk, 6.0% 30 day risk of cardiac arrest, MI or death. Cardiology consulted for additional dejan-op risk assessment on 8/15.

## 2022-08-17 NOTE — PROGRESS NOTE ADULT - PROBLEM SELECTOR PLAN 6
FENP: No IVF, Lytes PRN, Regular diet, HSQ ppx
FENP: No IVF, Lytes PRN, Regular diet, Lovenox ppx
FENP: No IVF, Lytes PRN, Regular diet, HSQ ppx
FENP: No IVF, Lytes PRN, Regular diet, HSQ ppx
FENP: No IVF, Lytes PRN, Regular diet, Lovenox ppx
FENP: No IVF, Lytes PRN, Regular diet, HSQ ppx
FENP: No IVF, Lytes PRN, Regular diet, HSQ ppx
FENP: No IVF, Lytes PRN, Regular diet, Lovenox ppx
FENP: No IVF, Lytes PRN, Regular diet, Lovenox ppx

## 2022-08-17 NOTE — PROGRESS NOTE ADULT - PROBLEM SELECTOR PROBLEM 2
ESRD on dialysis

## 2022-08-17 NOTE — PROGRESS NOTE ADULT - SUBJECTIVE AND OBJECTIVE BOX
[facility] Division of Hospital Medicine  Radha Zhu MD  Pager (M-F, 8A-5P): [phone]  Other Times:  [phone]      SUBJECTIVE / OVERNIGHT EVENTS: NAEON. Pt NPO this am, awaiting procedure for perm cath    MEDICATIONS  (STANDING):  amLODIPine   Tablet 10 milliGRAM(s) Oral daily  atorvastatin 20 milliGRAM(s) Oral at bedtime  chlorhexidine 2% Cloths 1 Application(s) Topical daily  chlorhexidine 4% Liquid 1 Application(s) Topical <User Schedule>  metoprolol tartrate 50 milliGRAM(s) Oral two times a day    MEDICATIONS  (PRN):  acetaminophen     Tablet .. 650 milliGRAM(s) Oral every 6 hours PRN Temp greater or equal to 38C (100.4F), Mild Pain (1 - 3)  aluminum hydroxide/magnesium hydroxide/simethicone Suspension 30 milliLiter(s) Oral every 4 hours PRN Dyspepsia  melatonin 3 milliGRAM(s) Oral at bedtime PRN Insomnia  ondansetron Injectable 4 milliGRAM(s) IV Push every 8 hours PRN Nausea and/or Vomiting  sodium chloride 0.9% lock flush 10 milliLiter(s) IV Push every 1 hour PRN Pre/post blood products, medications, blood draw, and to maintain line patency      I&O's Summary    16 Aug 2022 07:01  -  17 Aug 2022 07:00  --------------------------------------------------------  IN: 600 mL / OUT: 1900 mL / NET: -1300 mL        PHYSICAL EXAM:  Vital Signs Last 24 Hrs  T(C): 36.9 (17 Aug 2022 09:45), Max: 37.2 (16 Aug 2022 17:49)  T(F): 98.4 (17 Aug 2022 09:45), Max: 98.9 (16 Aug 2022 17:49)  HR: 65 (17 Aug 2022 09:45) (65 - 80)  BP: 152/90 (17 Aug 2022 09:45) (119/60 - 152/90)  BP(mean): --  RR: 18 (17 Aug 2022 09:45) (18 - 18)  SpO2: 100% (17 Aug 2022 09:45) (97% - 100%)    Parameters below as of 17 Aug 2022 09:45  Patient On (Oxygen Delivery Method): room air      CONSTITUTIONAL: NAD, well-developed, well-groomed  EYES: EOMI; conjunctiva and sclera clear  ENMT: Moist oral mucosa, normal dentition  NECK: Supple  RESPIRATORY: Normal respiratory effort; lungs are clear to auscultation bilaterally  CARDIOVASCULAR: Regular rate and rhythm, normal S1 and S2, No lower extremity edema; Peripheral pulses are 2+ bilaterally  ABDOMEN: Nontender to palpation, normoactive bowel sounds, no rebound/guarding  PSYCH: calm, affect appropriate  NEUROLOGY: moving all extremities, no focal deficits, no gross sensory deficits   SKIN: No rashes  +R [facility]  LABS:                        12.0   5.07  )-----------( 266      ( 17 Aug 2022 06:06 )             36.8     08-17    132<L>  |  92<L>  |  51<H>  ----------------------------<  85  4.3   |  22  |  9.68<H>    Ca    9.7      17 Aug 2022 06:06  Phos  5.1     08-17  Mg     2.20     08-17                  RADIOLOGY & ADDITIONAL TESTS:  Results Reviewed:   Imaging Personally Reviewed:  Electrocardiogram Personally Reviewed:    COORDINATION OF CARE:  Care Discussed with Consultants/Other Providers [Y/N]:  Prior or Outpatient Records Reviewed [Y/N]:

## 2022-08-17 NOTE — PROGRESS NOTE ADULT - SUBJECTIVE AND OBJECTIVE BOX
Vascular Surgery Progress Note    Subjective/24hour Events:   Patient seen and examined.      Vital Signs:  Vital Signs Last 24 Hrs  T(C): 36.3 (17 Aug 2022 05:50), Max: 37.2 (16 Aug 2022 17:49)  T(F): 97.3 (17 Aug 2022 05:50), Max: 98.9 (16 Aug 2022 17:49)  HR: 70 (17 Aug 2022 05:50) (70 - 80)  BP: 119/60 (17 Aug 2022 05:50) (113/67 - 131/88)  BP(mean): --  RR: 18 (17 Aug 2022 05:50) (17 - 18)  SpO2: 97% (17 Aug 2022 05:50) (97% - 99%)    Parameters below as of 17 Aug 2022 05:50  Patient On (Oxygen Delivery Method): room air        CAPILLARY BLOOD GLUCOSE          I&O's Detail    16 Aug 2022 07:01  -  17 Aug 2022 07:00  --------------------------------------------------------  IN:    Other (mL): 600 mL  Total IN: 600 mL    OUT:    Other (mL): 1900 mL  Total OUT: 1900 mL    Total NET: -1300 mL          Physical Exam:  Gen: NAD, resting comfortably in bed  CV: Regular rate  Resp: Nonlabored breathing on room air  Ext: RUE without thrill or pulse palpated in fistula; b/l upper extremities are protected        Labs:    08-17    132<L>  |  92<L>  |  51<H>  ----------------------------<  85  4.3   |  22  |  9.68<H>    Ca    9.7      17 Aug 2022 06:06  Phos  5.1     08-17  Mg     2.20     08-17                              12.0   5.07  )-----------( 266      ( 17 Aug 2022 06:06 )             36.8

## 2022-08-17 NOTE — PROGRESS NOTE ADULT - PROBLEM SELECTOR PLAN 3
Per patient's documents, she has hx of Afib though she does not know much of it.  -Will need to reach out to her PCP regarding hx of Afib and why she is not on AC. She denies hx of major GIB however she seems like a fall risk  -[id number] score 4  -Discussed with patient R/B of AC. Will hold AC until further clarification with PCP/cards of why she is not on AC. Daily risk of holding AC is very low.
Applied
Per patient's documents, she has hx of Afib though she does not know much of it.  -Will need to reach out to her PCP regarding hx of Afib and why she is not on AC. She denies hx of major GIB however she seems like a fall risk  -[id number] score 4  -Discussed with patient R/B of AC. Will hold AC tonight until further clarification with PCP/cards of why she is not on AC. Daily risk of holding AC is very low.
Per patient's documents, she has hx of Afib though she does not know much of it.  -Will need to reach out to her PCP regarding hx of Afib and why she is not on AC. She denies hx of major GIB however she seems like a fall risk  -[id number] score 4  -Discussed with patient R/B of AC. Will hold AC tonight until further clarification with PCP/cards of why she is not on AC. Daily risk of holding AC is very low.
Per patient's documents, she has hx of Afib though she does not know much of it.  -Will need to reach out to her PCP regarding hx of Afib and why she is not on AC. She denies hx of major GIB however she seems like a fall risk  -[id number] score 4  -Discussed with patient R/B of AC. Will hold AC until further clarification with PCP/cards of why she is not on AC. Daily risk of holding AC is very low.
Per patient's documents, she has hx of Afib though she does not know much of it.  -Will need to reach out to her PCP regarding hx of Afib and why she is not on AC. She denies hx of major GIB however she seems like a fall risk  -[id number] score 4  -Discussed with patient R/B of AC. Will hold AC until further clarification with PCP/cards of why she is not on AC. Daily risk of holding AC is very low.
Per patient's documents, she has hx of Afib though she does not know much of it.  -Will need to reach out to her PCP regarding hx of Afib and why she is not on AC. She denies hx of major GIB however she seems like a fall risk  -[id number] score 4  -Discussed with patient R/B of AC. Will hold AC tonight until further clarification with PCP/cards of why she is not on AC. Daily risk of holding AC is very low.
Per patient's documents, she has hx of Afib though she does not know much of it.  -Will need to reach out to her PCP regarding hx of Afib and why she is not on AC. She denies hx of major GIB however she seems like a fall risk  -[id number] score 4  -Discussed with patient R/B of AC. Will hold AC until further clarification with PCP/cards of why she is not on AC. Daily risk of holding AC is very low.
Per patient's documents, she has hx of Afib though she does not know much of it.  -Will need to reach out to her PCP regarding hx of Afib and why she is not on AC. She denies hx of major GIB however she seems like a fall risk  -[id number] score 4  -Discussed with patient R/B of AC. Will hold AC tonight until further clarification with PCP/cards of why she is not on AC. Daily risk of holding AC is very low.
Per patient's documents, she has hx of Afib though she does not know much of it.  -Will need to reach out to her PCP regarding hx of Afib and why she is not on AC. She denies hx of major GIB however she seems like a fall risk  -[id number] score 4  -Discussed with patient R/B of AC. Will hold AC until further clarification with PCP/cards of why she is not on AC. Daily risk of holding AC is very low.

## 2022-08-17 NOTE — PROGRESS NOTE ADULT - PROBLEM SELECTOR PROBLEM 4
Dialysis AV fistula malfunction

## 2022-08-17 NOTE — PROCEDURE NOTE - NSPROCDETAILS_GEN_ALL_CORE
guidewire recovered/lumen(s) aspirated and flushed/sterile dressing applied/sterile technique, catheter placed/ultrasound guidance with use of sterile gel and probe cove
guidewire recovered/lumen(s) aspirated and flushed/sterile dressing applied/sterile technique, catheter placed

## 2022-08-17 NOTE — PRE PROCEDURE NOTE - PRE PROCEDURE EVALUATION
Interventional Radiology    HPI:  81 Yr old female HTN, Left arm lymphedema, reported hx of Afib not on AC, ESRD On Tu/Th/Sat, Right AVF post HD noted bleeding, now requiring new AV fistula and presents for tunneled hemodialysis catheter for the interim.     Patient has nontunneled hemodialysis catheter in the groin. Imaging reviewed, has venous stent across the right subclavian/jugular.     Allergies:   NKDA    Medications (Abx/Cardiac/Anticoagulation/Blood Products)    amLODIPine   Tablet: 10 milliGRAM(s) Oral (08-17 @ 06:23)  heparin   Injectable: 5000 Unit(s) SubCutaneous (08-16 @ 22:18)  metoprolol tartrate: 50 milliGRAM(s) Oral (08-17 @ 06:22)      Home Medications  Caduet 10 mg-20 mg oral tablet: 1 tab(s) orally once a day  Metoprolol Tartrate 50 mg oral tablet: 1 tab(s) orally 2 times a day  sevelamer carbonate 800 mg oral tablet: 1 tab(s) orally 3 times a day (with meals)      Data:    T(C): 36.9  HR: 65  BP: 152/90  RR: 18  SpO2: 100%    Exam  General: No acute distress  Chest: Non labored breathing  Abdomen: Non-distended  Extremities: No swelling, warm    -WBC 5.07 / HgB 12.0 / Hct 36.8 / Plt 266  -Na 132 / Cl 92 / BUN 51 / Glucose 85  -K 4.3 / CO2 22 / Cr 9.68  -ALT -- / Alk Phos -- / T.Bili --    Imaging:     Plan: 81y Female presents for above procedure  -Risks/Benefits/alternatives explained with the patient and/or healthcare proxy and witnessed informed consent obtained.

## 2022-08-17 NOTE — PROGRESS NOTE ADULT - PROBLEM SELECTOR PLAN 1
ESRD on HD TThs.  Last HD session was 8/11 complicated by hypotension and poor access flows.  Patient seen and evaluated during dialysis today.  High access pressures preventing blood flow through catheter even after blood flow.  Patient will need catheter to be exchanged today as as she has not gotten adequate clearance in days.  Vascular service paged [phone].  Will plan for dialysis later today after catheter is changed.  Will use sodium modelling given IDH.  Hgb acceptable.
Chest pain has resolved but appeared to be more MSK in nature. Reproducible to touch. Trop elevated likely in setting of CKD however EKg without obvious ischemic changes except for TWI unsure if they are old.  -CT chest reviewed - will need re-imaging in few months time   -TTE reviewed, concentric LV remodeling, grossly nl LV systolic function, moderate TR  -Cardiology consulted for clearance, - pt sees Dr. Samy Hand at [facility] cardiology to see patient on Monday  -Hold ASA and AC for now unless clinical status changes
ESRD on HD TThs.  Last HD session was Saturday.  Plan for next HD tomorrow. Will cont to follow up with vascular regarding access issues. Will use sodium modelling given IDH.  Hgb acceptable. Will cont to monitor labs.
ESRD on HD TThs.  Last HD session was this AM. Awaiting permcath R IJ placment with IR. Patient also planned for a new AVF placement on Friday. Will cont to follow up with vascular regarding access issues. Will use sodium modelling given IDH.  Hgb acceptable. Will cont to monitor labs.
Chest pain has resolved but appeared to be more MSK in nature. Reproducible to touch. Trop elevated likely in setting of CKD however EKg without obvious ischemic changes except for TWI unsure if they are old.  -CT chest reviewed - will need re-imaging in few months time   -Echo pending  -Hold ASA and AC for now unless clinical status changes
Chest pain has resolved but appeared to be more MSK in nature. Reproducible to touch. Trop elevated likely in setting of CKD however EKg without obvious ischemic changes except for TWI unsure if they are old.  -CT chest reviewed - will need re-imaging in few months time   -TTE reviewed, concentric LV remodeling, grossly nl LV systolic function, moderate TR  -Cardiology consulted for clearance, - pt sees Dr. Samy Hand at [facility] cardiology to see patient on Monday  -Hold ASA and AC for now unless clinical status changes
Chest pain has resolved but appeared to be more MSK in nature. Reproducible to touch. Trop elevated likely in setting of CKD however EKg without obvious ischemic changes except for TWI unsure if they are old.  -CT chest reviewed - will need re-imaging in few months time   -Echo pending  - will obtain cardio eval for clearance - pt sees Dr. Samy Hand at [facility] - will call house   -Hold ASA and AC for now unless clinical status changes
Chest pain has resolved but appeared to be more MSK in nature. Reproducible to touch. Trop elevated likely in setting of CKD however EKg without obvious ischemic changes except for TWI unsure if they are old.  -Will get CT chest noncont to eval for subq hematoma and pleural effusion  -Echo pending  -Hold ASA and AC for now unless clinical status changes
Chest pain has resolved but appeared to be more MSK in nature. Reproducible to touch. Trop elevated likely in setting of CKD however EKg without obvious ischemic changes except for TWI unsure if they are old.  -CT chest reviewed - will need re-imaging in few months time   -TTE reviewed, concentric LV remodeling, grossly nl LV systolic function, moderate TR  -Cardiology consulted for clearance, - pt sees Dr. Samy Hand at [facility] cardiology to see patient on Monday  -Hold ASA--> ? no hx of CAD or PVD, and hold AC for now unless clinical status changes  - D/c tele 8/17
Chest pain has resolved but appeared to be more MSK in nature. Reproducible to touch. Trop elevated likely in setting of CKD however EKg without obvious ischemic changes except for TWI unsure if they are old.  -CT chest reviewed - will need re-imaging in few months time   -Echo pending  -Hold ASA and AC for now unless clinical status changes
Chest pain has resolved but appeared to be more MSK in nature. Reproducible to touch. Trop elevated likely in setting of CKD however EKg without obvious ischemic changes except for TWI unsure if they are old.  -CT chest reviewed - will need re-imaging in few months time   -TTE reviewed, concentric LV remodeling, grossly nl LV systolic function, moderate TR  -Cardiology consulted for clearance, - pt sees Dr. Samy Hand at [facility] cardiology to see patient on Monday  -Hold ASA--> ? no hx of CAD or PVD, and hold AC for now unless clinical status changes
Chest pain has resolved but appeared to be more MSK in nature. Reproducible to touch. Trop elevated likely in setting of CKD however EKg without obvious ischemic changes except for TWI unsure if they are old.  -CT chest reviewed - will need re-imaging in few months time   -TTE reviewed, concentric LV remodeling, grossly nl LV systolic function, moderate TR  -Cardiology consulted for clearance, - pt sees Dr. Samy Hand at [facility] cardiology to see patient on Monday  -Hold ASA--> ? no hx of CAD or PVD, and hold AC for now unless clinical status changes

## 2022-08-17 NOTE — PROCEDURE NOTE - ADDITIONAL PROCEDURE DETAILS
Exchange of Cordis over wire for 20 inch 14Fr double lumen hemodialysis catheter
-monitor for bleeding  -head of bed >45 degrees to prevent oozing  - tip is at the SVC, catheter is OK to use.   - SQH may be resumed @ 6h post procedure if no sign of bleeding  - all other orders and diet may be resumed per primary team

## 2022-08-18 ENCOUNTER — TRANSCRIPTION ENCOUNTER (OUTPATIENT)
Age: 82
End: 2022-08-18

## 2022-08-18 VITALS
DIASTOLIC BLOOD PRESSURE: 67 MMHG | OXYGEN SATURATION: 100 % | TEMPERATURE: 98 F | HEART RATE: 87 BPM | SYSTOLIC BLOOD PRESSURE: 120 MMHG | RESPIRATION RATE: 18 BRPM

## 2022-08-18 DIAGNOSIS — R93.89 ABNORMAL FINDINGS ON DIAGNOSTIC IMAGING OF OTHER SPECIFIED BODY STRUCTURES: ICD-10-CM

## 2022-08-18 LAB
ANION GAP SERPL CALC-SCNC: 17 MMOL/L — HIGH (ref 7–14)
BUN SERPL-MCNC: 67 MG/DL — HIGH (ref 7–23)
CALCIUM SERPL-MCNC: 9.3 MG/DL — SIGNIFICANT CHANGE UP (ref 8.4–10.5)
CHLORIDE SERPL-SCNC: 94 MMOL/L — LOW (ref 98–107)
CO2 SERPL-SCNC: 22 MMOL/L — SIGNIFICANT CHANGE UP (ref 22–31)
CREAT SERPL-MCNC: 11.18 MG/DL — HIGH (ref 0.5–1.3)
EGFR: 3 ML/MIN/1.73M2 — LOW
GLUCOSE SERPL-MCNC: 86 MG/DL — SIGNIFICANT CHANGE UP (ref 70–99)
HCT VFR BLD CALC: 34 % — LOW (ref 34.5–45)
HGB BLD-MCNC: 11.1 G/DL — LOW (ref 11.5–15.5)
MCHC RBC-ENTMCNC: 30.7 PG — SIGNIFICANT CHANGE UP (ref 27–34)
MCHC RBC-ENTMCNC: 32.6 GM/DL — SIGNIFICANT CHANGE UP (ref 32–36)
MCV RBC AUTO: 93.9 FL — SIGNIFICANT CHANGE UP (ref 80–100)
NRBC # BLD: 0 /100 WBCS — SIGNIFICANT CHANGE UP (ref 0–0)
NRBC # FLD: 0 K/UL — SIGNIFICANT CHANGE UP (ref 0–0)
PLATELET # BLD AUTO: 262 K/UL — SIGNIFICANT CHANGE UP (ref 150–400)
POTASSIUM SERPL-MCNC: 4.7 MMOL/L — SIGNIFICANT CHANGE UP (ref 3.5–5.3)
POTASSIUM SERPL-SCNC: 4.7 MMOL/L — SIGNIFICANT CHANGE UP (ref 3.5–5.3)
RBC # BLD: 3.62 M/UL — LOW (ref 3.8–5.2)
RBC # FLD: 15.4 % — HIGH (ref 10.3–14.5)
SARS-COV-2 RNA SPEC QL NAA+PROBE: SIGNIFICANT CHANGE UP
SODIUM SERPL-SCNC: 133 MMOL/L — LOW (ref 135–145)
WBC # BLD: 5.52 K/UL — SIGNIFICANT CHANGE UP (ref 3.8–10.5)
WBC # FLD AUTO: 5.52 K/UL — SIGNIFICANT CHANGE UP (ref 3.8–10.5)

## 2022-08-18 PROCEDURE — 90935 HEMODIALYSIS ONE EVALUATION: CPT

## 2022-08-18 PROCEDURE — 99239 HOSP IP/OBS DSCHRG MGMT >30: CPT

## 2022-08-18 RX ORDER — METOPROLOL TARTRATE 50 MG
1 TABLET ORAL
Qty: 0 | Refills: 0 | DISCHARGE

## 2022-08-18 RX ORDER — METOPROLOL TARTRATE 50 MG
1 TABLET ORAL
Qty: 0 | Refills: 0 | DISCHARGE
Start: 2022-08-18

## 2022-08-18 RX ADMIN — CHLORHEXIDINE GLUCONATE 1 APPLICATION(S): 213 SOLUTION TOPICAL at 11:48

## 2022-08-18 RX ADMIN — CHLORHEXIDINE GLUCONATE 1 APPLICATION(S): 213 SOLUTION TOPICAL at 05:23

## 2022-08-18 RX ADMIN — Medication 50 MILLIGRAM(S): at 17:53

## 2022-08-18 NOTE — PROGRESS NOTE ADULT - PROVIDER SPECIALTY LIST ADULT
Vascular Surgery
Hospitalist
Hospitalist
Nephrology
Vascular Surgery
Hospitalist
Nephrology
Vascular Surgery
Nephrology
Vascular Surgery
Vascular Surgery
Nephrology
Vascular Surgery
Nephrology
Hospitalist

## 2022-08-18 NOTE — DISCHARGE NOTE PROVIDER - CARE PROVIDER_API CALL
Eric Atkinson (MD)  Surgery  Vascular  [address], [address] [place] [address]  Phone: [phone]  Fax: [phone]  Follow Up Time: 2 weeks

## 2022-08-18 NOTE — PROGRESS NOTE ADULT - PROBLEM SELECTOR PROBLEM 1
ESRD on dialysis
ESRD on dialysis
Chest pain
Chest pain
ESRD on dialysis
Chest pain
Chest pain
ESRD on dialysis
ESRD on dialysis
Chest pain

## 2022-08-18 NOTE — DISCHARGE NOTE NURSING/CASE MANAGEMENT/SOCIAL WORK - NSSCNAMETXT_GEN_ALL_CORE
[facility]- this agency was notified to reinstate your CDPAP for SOC 8/19/22.  Please call [facility] to confirm your discharge.

## 2022-08-18 NOTE — PROGRESS NOTE ADULT - REASON FOR ADMISSION
Chest pain

## 2022-08-18 NOTE — DISCHARGE NOTE NURSING/CASE MANAGEMENT/SOCIAL WORK - NSDCCRNAME_GEN_ALL_CORE_FT
[facility]   HD reinstated for appt Saturday 8/20

## 2022-08-18 NOTE — DISCHARGE NOTE PROVIDER - NSDCMRMEDTOKEN_GEN_ALL_CORE_FT
Caduet 10 mg-20 mg oral tablet: 1 tab(s) orally once a day  metoprolol tartrate 50 mg oral tablet: 1 tab(s) orally 2 times a day  sevelamer carbonate 800 mg oral tablet: 1 tab(s) orally 3 times a day (with meals)

## 2022-08-18 NOTE — DISCHARGE NOTE NURSING/CASE MANAGEMENT/SOCIAL WORK - PATIENT PORTAL LINK FT
You can access the FollowMyHealth Patient Portal offered by [facility] by registering at the following website: [email or web address]. By joining [facility]’s FollowMyHealth portal, you will also be able to view your health information using other applications (apps) compatible with our system.

## 2022-08-18 NOTE — DISCHARGE NOTE PROVIDER - NSDCCPCAREPLAN_GEN_ALL_CORE_FT
PRINCIPAL DISCHARGE DIAGNOSIS  Diagnosis: Chest pain  Assessment and Plan of Treatment: You came to the hospital with chest pain. YOu were seen by Cardiology and cleared. Your workup has been reassuring. If you experience new chest pain, difficulty breathing, lightheadedness, fainting or palpitations, arm pain, abdominal pain, vomiting or sweating, seek immediate medical attention. Please follow up with Cardiology as outpatient and cont inue your home medications.      SECONDARY DISCHARGE DIAGNOSES  Diagnosis: Dialysis patient  Assessment and Plan of Treatment: YOu came to the hospital with a thrombosed AV fistula. You had a Shiley catheter placed for temporary dialysis access which was removed. You can remove the dressint at the site later tonight if no further bleeding is present. If bleeding recurs, HOLD FIRM PRESSURE with your hand at the site. If bleeding does not resolve, seek immediate medical attentnion.   YOu subsequently had a Permacath placed for definitive dialysis access. You were seen by Vascular Surgery who are planning a new AV fistula creation as OUTPATIENT. Please follow up with Dr. Atkinson in 2 weeks for this as per Vascular Surgery's recommendations. Your dialysis sessions will be reinstated prior to your discharge.    Diagnosis: HTN (hypertension)  Assessment and Plan of Treatment: Continue home meds.     PRINCIPAL DISCHARGE DIAGNOSIS  Diagnosis: Chest pain  Assessment and Plan of Treatment: You came to the hospital with chest pain. YOu were seen by Cardiology and cleared. Your workup has been reassuring. If you experience new chest pain, difficulty breathing, lightheadedness, fainting or palpitations, arm pain, abdominal pain, vomiting or sweating, seek immediate medical attention. Please follow up with Cardiology as outpatient and cont inue your home medications.      SECONDARY DISCHARGE DIAGNOSES  Diagnosis: Dialysis patient  Assessment and Plan of Treatment: YOu came to the hospital with a thrombosed AV fistula. You had a Shiley catheter placed for temporary dialysis access which was removed. You can remove the dressint at the site later tonight if no further bleeding is present. If bleeding recurs, HOLD FIRM PRESSURE with your hand at the site. If bleeding does not resolve, seek immediate medical attentnion.   YOu subsequently had a Permacath placed for definitive dialysis access. You were seen by Vascular Surgery who are planning a new AV fistula creation as OUTPATIENT. Please follow up with Dr. Atkinson in 2 weeks for this as per Vascular Surgery's recommendations. Your dialysis sessions will be reinstated prior to your discharge.    Diagnosis: HTN (hypertension)  Assessment and Plan of Treatment: Continue home meds.    Diagnosis: Atrial fibrillation  Assessment and Plan of Treatment: You have a reported history of Afib however are not on anticoagulation but the reason why not is unclear. We have tried to contact your Primary Doctor however were unable to discuss. Please follow up with your PMD regarding whether you require long term anticoagulation to reduce stroke risk given your Afib.     PRINCIPAL DISCHARGE DIAGNOSIS  Diagnosis: Chest pain  Assessment and Plan of Treatment: You came to the hospital with chest pain. YOu were seen by Cardiology and cleared. Your workup has been reassuring. If you experience new chest pain, difficulty breathing, lightheadedness, fainting or palpitations, arm pain, abdominal pain, vomiting or sweating, seek immediate medical attention. Please follow up with Cardiology as outpatient and cont inue your home medications.      SECONDARY DISCHARGE DIAGNOSES  Diagnosis: Dialysis patient  Assessment and Plan of Treatment: YOu came to the hospital with a thrombosed AV fistula. You had a Shiley catheter placed for temporary dialysis access which was removed. You can remove the dressint at the site later tonight if no further bleeding is present. If bleeding recurs, HOLD FIRM PRESSURE with your hand at the site. If bleeding does not resolve, seek immediate medical attentnion.   YOu subsequently had a Permacath placed for definitive dialysis access. You were seen by Vascular Surgery who are planning a new AV fistula creation as OUTPATIENT. Please follow up with Dr. Atkinson in 2 weeks for this as per Vascular Surgery's recommendations. Please do not use the right upper extremity for blood draws or IVs per Vascular Surgery. Your dialysis sessions will be reinstated prior to your discharge.    Diagnosis: HTN (hypertension)  Assessment and Plan of Treatment: Continue home meds.    Diagnosis: Atrial fibrillation  Assessment and Plan of Treatment: You have a reported history of Afib however are not on anticoagulation but the reason why not is unclear. We have tried to contact your Primary Doctor however were unable to discuss. Please follow up with your PMD regarding whether you require long term anticoagulation to reduce stroke risk given your Afib.

## 2022-08-18 NOTE — DISCHARGE NOTE PROVIDER - DETAILS OF MALNUTRITION DIAGNOSIS/DIAGNOSES
This patient has been assessed with a concern for Malnutrition and was treated during this hospitalization for the following Nutrition diagnosis/diagnoses:     -  08/15/2022: Moderate protein-calorie malnutrition

## 2022-08-18 NOTE — DISCHARGE NOTE NURSING/CASE MANAGEMENT/SOCIAL WORK - NSDCPEFALRISK_GEN_ALL_CORE
For information on Fall & Injury Prevention, visit: [email or web address] OR  [email or web address] OR  https://www.cdc.gov/steadi/patient.html

## 2022-08-18 NOTE — PROGRESS NOTE ADULT - SUBJECTIVE AND OBJECTIVE BOX
===========================================  [facility] NEPHROLOGY CONSULT TEAM  -----------------------------------------------------------------------------    Alex Jenkins Pls contact me via [employer] Teams today for any q's or c's about the patient!  After 5 pm or on weekends use "[employer] for fellow on call    ============================================  [facility] DIVISION OF KIDNEY DISEASES AND HYPERTENSION -- [phone]  --------------------------------------------------------------------------------  8/18/22  - Chart reviewed. Pt seen and examined during HD  - Feeling well. No complaints      PAST HISTORY  --------------------------------------------------------------------------------  PAST MEDICAL & SURGICAL HISTORY:  HTN (hypertension)  ESRD on hemodialysis  Lymphedema        FAMILY HISTORY: Multiple family members with HTN    PAST SOCIAL HISTORY: Denies ETOH, and history of smoking.    ALLERGIES & MEDICATIONS  --------------------------------------------------------------------------------  Allergies    No Known Allergies    Intolerances    MEDICATIONS  (STANDING):  amLODIPine   Tablet 10 milliGRAM(s) Oral daily  atorvastatin 20 milliGRAM(s) Oral at bedtime  chlorhexidine 2% Cloths 1 Application(s) Topical daily  chlorhexidine 4% Liquid 1 Application(s) Topical <User Schedule>  metoprolol tartrate 50 milliGRAM(s) Oral two times a day    MEDICATIONS  (PRN):  acetaminophen     Tablet .. 650 milliGRAM(s) Oral every 6 hours PRN Temp greater or equal to 38C (100.4F), Mild Pain (1 - 3)  aluminum hydroxide/magnesium hydroxide/simethicone Suspension 30 milliLiter(s) Oral every 4 hours PRN Dyspepsia  melatonin 3 milliGRAM(s) Oral at bedtime PRN Insomnia  ondansetron Injectable 4 milliGRAM(s) IV Push every 8 hours PRN Nausea and/or Vomiting  sodium chloride 0.9% lock flush 10 milliLiter(s) IV Push every 1 hour PRN Pre/post blood products, medications, blood draw, and to maintain line patency      REVIEW OF SYSTEMS  --------------------------------------------------------------------------------  Gen: No fevers/chills  Skin: No rashes  Head/Eyes/Ears: Normal hearing,   Respiratory: No dyspnea, cough  CV: Positive for chest pain  GI: No abdominal pain, Positive for decreased appetite  : No dysuria, hematuria  MSK: No edema  Heme: No easy bruising or bleeding  Psych: No significant depression    All other systems were reviewed and are negative, except as noted.    VITALS/PHYSICAL EXAM  --------------------------------------------------------------------------------  Vital Signs Last 24 Hrs  T(C): 37.2 (18 Aug 2022 07:00), Max: 37.2 (18 Aug 2022 07:00)  T(F): 99 (18 Aug 2022 07:00), Max: 99 (18 Aug 2022 07:00)  HR: 68 (18 Aug 2022 07:00) (60 - 68)  BP: 107/53 (18 Aug 2022 07:00) (107/53 - 152/90)  BP(mean): --  RR: 18 (18 Aug 2022 07:00) (18 - 18)  SpO2: 98% (18 Aug 2022 05:22) (97% - 100%)    Parameters below as of 18 Aug 2022 07:00  Patient On (Oxygen Delivery Method): room air    GEN: Awake, alert, NAD  HEAD: Normocephalic and atraumatic,   EYES: Anicteric sclerae, EOM's grossly intact  NECK: No JVD, no thyromegaly  PULM: Comfortable work of breathing, clear BS  CV: Pulses 2+ symmetric bilaterally, regular rate and rhythm  ABD: Not distended, soft, non-tender,   EXT: No edema, No deformities  PSYCH: Appropriate mood and affect, no suicidal ideations elicited  NEURO: No facial asymmetry, no tremors, no observed movement disorders  SKIN: No rashes or lesions on exposed skin, No jaundice  RUE PIOTR  [facility]      LABS/STUDIES  --------------------------------------------------------------------------------  08-18    133<L>  |  94<L>  |  67<H>  ----------------------------<  86  4.7   |  22  |  11.18<H>    Ca    9.3      18 Aug 2022 07:00  Phos  5.1     08-17  Mg     2.20     08-17

## 2022-08-18 NOTE — CHART NOTE - NSCHARTNOTEFT_GEN_A_CORE
Patient s/p Permacath for HD access. Shiley no longer indicated. Patient laid supine- Two an choring sutures removed and R-femoral Shiley catheter removed. Direct firm manual pressure applied for 2 minutes with successful hemostasis. Patient tolerated well, no immediate co mplications. Patient s/p Permacath for HD access. Shiley no longer indicated. Patient laid supine- Two an choring sutures removed and R-femoral Shiley catheter removed. Direct firm manual pressure applied for 2 minutes with successful hemostasis. PRessure dressing applied. Patient tolerated well, no immediate co mplications.

## 2022-08-18 NOTE — PROGRESS NOTE ADULT - ASSESSMENT
Pt with ESRD on HD admitted for AVF malfunction    =================================  # ESRD ON HD  - SCHED: TTS  - UNIT: [facility]  --- PLAN:  > HD: Seen during HD today. Orders placed and reviewed. D/w HD unit  > NEXT HD: Sat 8/20. Orders placed    # HYPOTENSION: Pt reminded not to take AM BP meds during HD days. Stable.   # ACCESS:   - Left IJ NTDC placed 8/17  - RUE AVF thrombosed  - LUE with lymphedema  - Vasc Sx: Plan for outpatient ff up with Dr. Atkinson for complex dialysis access creation  # ANEMIA: Hgb >10  # MBD: Phos within acceptable range. Off binders.

## 2022-08-18 NOTE — DISCHARGE NOTE PROVIDER - HOSPITAL COURSE
81 Yr old female HTN, Left arm lymphedema, reported hx of Afib not on AC, ESRD On Tu/Th/Sat, Right AVF last HD session on Thursday now p/w R chest pain reproducible on palpation after TPA of her fistula. Admitted for new vascular access for dialysis given R AVF failure and chest pain work up. EKG nonischemic. Trops elevated however in s/o ESRD. Pain resolved. Underwent Permacath placement on 8/17 with removal of Shiley 8/18.  TTE reviewed, concentric LV remodeling, grossly nl LV systolic function, moderate TR. 81 Yr old female HTN, Left arm lymphedema, reported hx of Afib not on AC, ESRD On Tu/Th/Sat, Right AVF last HD session on Thursday now p/w R chest pain reproducible on palpation after TPA of her fistula. Admitted for new vascular access for dialysis given R AVF failure and chest pain work up. EKG nonischemic. Trops elevated however in s/o ESRD. Pain resolved. Underwent Permacath placement on 8/17 with removal of Shiley 8/18.  TTE reviewed, concentric LV remodeling, grossly nl LV systolic function, moderate TR.     On 8/18/22 case d/w Dr. Zhu. Patient stable for DC home.  HD reinstated per SW. 81 Yr old female HTN, Left arm lymphedema, reported hx of Afib not on AC, ESRD On Tu/Th/Sat, Right AVF last HD session on Thursday now p/w R chest pain reproducible on palpation after TPA of her fistula. Admitted for new vascular access for dialysis given R AVF failure and chest pain work up. EKG nonischemic. Trops elevated however in s/o ESRD. Pain resolved. Underwent Permacath placement on 8/17 with removal of femoral Shiley 8/18.  TTE reviewed, concentric LV remodeling, grossly nl LV systolic function, moderate TR.     On 8/18/22 case d/w Dr. Zhu. Patient stable for DC home.  HD reinstated per SW.     Follow up with PCP (pt has a documentation of a fib in the chart, however she does not think she has this and is not on AC) in 1 week from d/c. F/u with Vascular in 1-2 weeks for planned AVF placement. 81 Yr old female HTN, Left arm lymphedema, reported hx of Afib not on AC, ESRD On Tu/Th/Sat, Right AVF last HD session on Thursday now p/w R chest pain reproducible on palpation after TPA of her fistula. Admitted for new vascular access for dialysis given R AVF failure and chest pain work up. EKG nonischemic. Trops elevated however in s/o ESRD. Pain resolved. Underwent Permacath placement on 8/17 with removal of femoral Shiley 8/18.  TTE reviewed, concentric LV remodeling, grossly nl LV systolic function, moderate TR.     On 8/18/22 case d/w Dr. Zhu. Patient stable for DC home.  HD reinstated per SW. Patient seen and examined by attending physician on morning of discharge day. Greater than 30 minutes spent on discharge preparation and education.    Follow up with PCP (pt has a documentation of a fib in the chart, however she does not think she has this and is not on AC) in 1 week from d/c. F/u with Vascular in 1-2 weeks for planned AVF placement.

## 2022-08-19 PROBLEM — I89.0 LYMPHEDEMA, NOT ELSEWHERE CLASSIFIED: Chronic | Status: ACTIVE | Noted: 2022-08-08

## 2022-08-19 PROBLEM — N18.6 END STAGE RENAL DISEASE: Chronic | Status: ACTIVE | Noted: 2022-08-08

## 2022-08-19 PROBLEM — I48.91 UNSPECIFIED ATRIAL FIBRILLATION: Chronic | Status: ACTIVE | Noted: 2022-08-08

## 2022-08-19 PROBLEM — I10 ESSENTIAL (PRIMARY) HYPERTENSION: Chronic | Status: ACTIVE | Noted: 2022-08-08

## 2022-08-29 PROBLEM — Z00.00 ENCOUNTER FOR PREVENTIVE HEALTH EXAMINATION: Status: ACTIVE | Noted: 2022-08-29

## 2022-09-14 ENCOUNTER — APPOINTMENT (OUTPATIENT)
Dept: VASCULAR SURGERY | Facility: CLINIC | Age: 82
End: 2022-09-14

## 2022-09-14 ENCOUNTER — NON-APPOINTMENT (OUTPATIENT)
Age: 82
End: 2022-09-14

## 2022-09-14 VITALS
TEMPERATURE: 98 F | WEIGHT: 165 LBS | BODY MASS INDEX: 27.49 KG/M2 | HEART RATE: 59 BPM | HEIGHT: 65 IN | DIASTOLIC BLOOD PRESSURE: 127 MMHG | SYSTOLIC BLOOD PRESSURE: 193 MMHG

## 2022-09-14 DIAGNOSIS — Z78.9 OTHER SPECIFIED HEALTH STATUS: ICD-10-CM

## 2022-09-14 DIAGNOSIS — Z99.2 DEPENDENCE ON RENAL DIALYSIS: ICD-10-CM

## 2022-09-14 DIAGNOSIS — Z99.2 END STAGE RENAL DISEASE: ICD-10-CM

## 2022-09-14 DIAGNOSIS — Z87.448 PERSONAL HISTORY OF OTHER DISEASES OF URINARY SYSTEM: ICD-10-CM

## 2022-09-14 DIAGNOSIS — Z85.3 PERSONAL HISTORY OF MALIGNANT NEOPLASM OF BREAST: ICD-10-CM

## 2022-09-14 DIAGNOSIS — N18.6 END STAGE RENAL DISEASE: ICD-10-CM

## 2022-09-14 DIAGNOSIS — Z86.79 PERSONAL HISTORY OF OTHER DISEASES OF THE CIRCULATORY SYSTEM: ICD-10-CM

## 2022-09-14 PROCEDURE — 99213 OFFICE O/P EST LOW 20 MIN: CPT

## 2022-09-14 PROCEDURE — 99072 ADDL SUPL MATRL&STAF TM PHE: CPT

## 2022-09-14 RX ORDER — AMLODIPINE AND ATORVASTATIN 10; 20 MG/1; MG/1
10-20 TABLET, FILM COATED ORAL
Refills: 0 | Status: ACTIVE | COMMUNITY

## 2022-09-14 RX ORDER — METOPROLOL TARTRATE 50 MG/1
50 TABLET, FILM COATED ORAL
Refills: 0 | Status: ACTIVE | COMMUNITY

## 2022-09-14 RX ORDER — FOLIC ACID/VIT B COMPLEX AND C 0.8 MG
TABLET ORAL
Refills: 0 | Status: ACTIVE | COMMUNITY

## 2022-09-14 RX ORDER — SEVELAMER CARBONATE 800 MG/1
800 TABLET, FILM COATED ORAL
Refills: 0 | Status: ACTIVE | COMMUNITY

## 2022-09-14 NOTE — HISTORY OF PRESENT ILLNESS
[FreeTextEntry1] : 81 yo F with ESRD dependent on renal dialysis. 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 She is receiving dialysis via tunneled catheter and is following up post hospitalization for planning purposes for new access.\par \par Hx of left mastectomy and axillary dissection with resultant lymphedema of left arm. [de-identified] : No new complaints.

## 2022-09-14 NOTE — PHYSICAL EXAM
[Respiratory Effort] : normal respiratory effort [Normal Rate and Rhythm] : normal rate and rhythm [2+] : right 2+ [Abdomen Tenderness] : ~T ~M No abdominal tenderness [Skin Ulcer] : no ulcer [Alert] : alert [Oriented to Person] : oriented to person [Oriented to Place] : oriented to place [Oriented to Time] : oriented to time [Calm] : calm [de-identified] : appears stated age [de-identified] : normocephalic, atraumatic [de-identified] : supple [FreeTextEntry1] : no palpable pulse in right upper arm AVF\par left arm with lymphedema

## 2022-09-14 NOTE — ASSESSMENT
[FreeTextEntry1] : Problem #1 dialysis access\par - will plan for RUE arteriovenous creation, possible graft\par - patient wants to schedule in early November. I explained the risks of waiting including prolonged catheter dwell time, increased risk for bacteremia/sepsis, increased risk of central venous stenosis\par - RUE precautions\par

## 2022-11-11 ENCOUNTER — APPOINTMENT (OUTPATIENT)
Dept: VASCULAR SURGERY | Facility: HOSPITAL | Age: 82
End: 2022-11-11

## 2023-06-12 NOTE — ED PROVIDER NOTE - NSICDXPASTMEDICALHX_GEN_ALL_CORE_FT
PAST MEDICAL HISTORY:  ESRD on hemodialysis     HTN (hypertension)     Lymphedema      You can access the FollowMyHealth Patient Portal offered by [facility] by registering at the following website: [email or web address]. By joining [employer]’s FollowMyHealth portal, you will also be able to view your health information using other applications (apps) compatible with our system.

## 2023-11-29 NOTE — ED PROCEDURE NOTE - NS ED ATTENDING STATEMENT MOD
Quality 226: Preventive Care And Screening: Tobacco Use: Screening And Cessation Intervention: Patient screened for tobacco use and is an ex/non-smoker Quality 111:Pneumonia Vaccination Status For Older Adults: Patient received any pneumococcal conjugate or polysaccharide vaccine on or after their 60th birthday and before the end of the measurement period Quality 130: Documentation Of Current Medications In The Medical Record: Current Medications Documented Quality 47: Advance Care Plan: Advance Care Planning discussed and documented in the medical record; patient did not wish or was not able to name a surrogate decision maker or provide an advance care plan. Quality 431: Preventive Care And Screening: Unhealthy Alcohol Use - Screening: Patient not identified as an unhealthy alcohol user when screened for unhealthy alcohol use using a systematic screening method Detail Level: Detailed Quality 110: Preventive Care And Screening: Influenza Immunization: Influenza Immunization Administered during Influenza season Attending with

## 2024-09-12 NOTE — H&P ADULT - NSHPPHYSICALEXAM_GEN_ALL_CORE
PHYSICAL EXAM:  GENERAL: NAD, lethargic appearing  HEAD:  Atraumatic, Normocephalic  EYES: EOMI, PERRL, conjunctiva and sclera clear  NECK: Supple, No JVD  CHEST/LUNG: Clear to auscultation bilaterally; No wheezes, rales or rhonchi, R chest wall pain on palpation  HEART: Regular rate and rhythm; No murmurs, rubs, or gallops, (+)S1, S2  ABDOMEN: Soft, Nontender, Nondistended; Normal Bowel sounds   EXTREMITIES:  2+ Peripheral Pulses, L lymphadema  PSYCH: normal mood and affect  NEUROLOGY: AAOx3, non-focal, able to move all extremities spontaneously and on command  SKIN: No rashes or lesions 2-4 times/mo

## 2024-12-27 NOTE — PROGRESS NOTE ADULT - PROBLEM/PLAN-5
DISPLAY PLAN FREE TEXT
To get better and follow your care plan as instructed.
DISPLAY PLAN FREE TEXT

## 2025-03-03 NOTE — PATIENT PROFILE ADULT - FUNCTIONAL ASSESSMENT - DAILY ACTIVITY 5.
[Time Spent: ___ minutes] : I have spent [unfilled] minutes of time on the encounter which excludes teaching and separately reported services.
[Time Spent: ___ minutes] : I have spent [unfilled] minutes of time on the encounter which excludes teaching and separately reported services.
4 = No assist / stand by assistance